# Patient Record
Sex: MALE | Race: WHITE | Employment: OTHER | URBAN - METROPOLITAN AREA
[De-identification: names, ages, dates, MRNs, and addresses within clinical notes are randomized per-mention and may not be internally consistent; named-entity substitution may affect disease eponyms.]

---

## 2023-05-26 ENCOUNTER — APPOINTMENT (OUTPATIENT)
Dept: GENERAL RADIOLOGY | Age: 63
End: 2023-05-26
Payer: MEDICARE

## 2023-05-26 ENCOUNTER — APPOINTMENT (OUTPATIENT)
Dept: ULTRASOUND IMAGING | Age: 63
End: 2023-05-26
Payer: MEDICARE

## 2023-05-26 ENCOUNTER — APPOINTMENT (OUTPATIENT)
Dept: CT IMAGING | Age: 63
End: 2023-05-26
Payer: MEDICARE

## 2023-05-26 ENCOUNTER — HOSPITAL ENCOUNTER (EMERGENCY)
Age: 63
Discharge: HOME | End: 2023-05-27
Attending: EMERGENCY MEDICINE
Payer: MEDICARE

## 2023-05-26 DIAGNOSIS — M79.605 BILATERAL LOWER EXTREMITY PAIN: ICD-10-CM

## 2023-05-26 DIAGNOSIS — M79.604 BILATERAL LOWER EXTREMITY PAIN: ICD-10-CM

## 2023-05-26 DIAGNOSIS — M25.532 LEFT WRIST PAIN: ICD-10-CM

## 2023-05-26 DIAGNOSIS — R50.9 FEVER OF UNKNOWN ORIGIN: Primary | ICD-10-CM

## 2023-05-26 LAB
ALBUMIN SERPL-MCNC: 3.6 G/DL (ref 3.2–4.6)
ALBUMIN/GLOB SERPL: 0.8 (ref 0.4–1.6)
ALP SERPL-CCNC: 95 U/L (ref 50–136)
ALT SERPL-CCNC: 44 U/L (ref 12–65)
ANION GAP SERPL CALC-SCNC: 5 MMOL/L (ref 2–11)
APPEARANCE UR: CLEAR
AST SERPL-CCNC: 19 U/L (ref 15–37)
BACTERIA URNS QL MICRO: NEGATIVE /HPF
BILIRUB SERPL-MCNC: 0.4 MG/DL (ref 0.2–1.1)
BILIRUB UR QL: NEGATIVE
BUN SERPL-MCNC: 16 MG/DL (ref 8–23)
CALCIUM SERPL-MCNC: 10.2 MG/DL (ref 8.3–10.4)
CASTS URNS QL MICRO: ABNORMAL /LPF
CHLORIDE SERPL-SCNC: 105 MMOL/L (ref 101–110)
CO2 SERPL-SCNC: 28 MMOL/L (ref 21–32)
COLOR UR: ABNORMAL
CREAT SERPL-MCNC: 1.1 MG/DL (ref 0.8–1.5)
D DIMER PPP FEU-MCNC: 1.08 UG/ML(FEU)
EPI CELLS #/AREA URNS HPF: ABNORMAL /HPF
ERYTHROCYTE [DISTWIDTH] IN BLOOD BY AUTOMATED COUNT: 12.3 % (ref 11.9–14.6)
GLOBULIN SER CALC-MCNC: 4.5 G/DL (ref 2.8–4.5)
GLUCOSE SERPL-MCNC: 112 MG/DL (ref 65–100)
GLUCOSE UR STRIP.AUTO-MCNC: NEGATIVE MG/DL
HCT VFR BLD AUTO: 43 % (ref 41.1–50.3)
HGB BLD-MCNC: 14.4 G/DL (ref 13.6–17.2)
HGB UR QL STRIP: ABNORMAL
KETONES UR QL STRIP.AUTO: ABNORMAL MG/DL
LEUKOCYTE ESTERASE UR QL STRIP.AUTO: NEGATIVE
MCH RBC QN AUTO: 30.1 PG (ref 26.1–32.9)
MCHC RBC AUTO-ENTMCNC: 33.5 G/DL (ref 31.4–35)
MCV RBC AUTO: 89.8 FL (ref 82–102)
NITRITE UR QL STRIP.AUTO: NEGATIVE
NRBC # BLD: 0 K/UL (ref 0–0.2)
PH UR STRIP: 5.5 (ref 5–9)
PLATELET # BLD AUTO: 345 K/UL (ref 150–450)
PMV BLD AUTO: 9.7 FL (ref 9.4–12.3)
POTASSIUM SERPL-SCNC: 3.8 MMOL/L (ref 3.5–5.1)
PROT SERPL-MCNC: 8.1 G/DL (ref 6.3–8.2)
PROT UR STRIP-MCNC: NEGATIVE MG/DL
RBC # BLD AUTO: 4.79 M/UL (ref 4.23–5.6)
RBC #/AREA URNS HPF: ABNORMAL /HPF
SODIUM SERPL-SCNC: 138 MMOL/L (ref 133–143)
SP GR UR REFRACTOMETRY: 1.03 (ref 1–1.02)
TROPONIN I SERPL HS-MCNC: 3.7 PG/ML (ref 0–57)
UROBILINOGEN UR QL STRIP.AUTO: 0.2 EU/DL (ref 0.2–1)
WBC # BLD AUTO: 13.9 K/UL (ref 4.3–11.1)
WBC URNS QL MICRO: ABNORMAL /HPF

## 2023-05-26 PROCEDURE — 6360000004 HC RX CONTRAST MEDICATION: Performed by: EMERGENCY MEDICINE

## 2023-05-26 PROCEDURE — 71260 CT THORAX DX C+: CPT

## 2023-05-26 PROCEDURE — 85379 FIBRIN DEGRADATION QUANT: CPT

## 2023-05-26 PROCEDURE — 81001 URINALYSIS AUTO W/SCOPE: CPT

## 2023-05-26 PROCEDURE — 93005 ELECTROCARDIOGRAM TRACING: CPT | Performed by: EMERGENCY MEDICINE

## 2023-05-26 PROCEDURE — 93971 EXTREMITY STUDY: CPT

## 2023-05-26 PROCEDURE — 80053 COMPREHEN METABOLIC PANEL: CPT

## 2023-05-26 PROCEDURE — 94762 N-INVAS EAR/PLS OXIMTRY CONT: CPT

## 2023-05-26 PROCEDURE — 85027 COMPLETE CBC AUTOMATED: CPT

## 2023-05-26 PROCEDURE — 84484 ASSAY OF TROPONIN QUANT: CPT

## 2023-05-26 PROCEDURE — 99285 EMERGENCY DEPT VISIT HI MDM: CPT

## 2023-05-26 PROCEDURE — 73110 X-RAY EXAM OF WRIST: CPT

## 2023-05-26 PROCEDURE — 2580000003 HC RX 258: Performed by: EMERGENCY MEDICINE

## 2023-05-26 RX ORDER — SODIUM CHLORIDE 0.9 % (FLUSH) 0.9 %
10 SYRINGE (ML) INJECTION
Status: COMPLETED | OUTPATIENT
Start: 2023-05-26 | End: 2023-05-26

## 2023-05-26 RX ORDER — 0.9 % SODIUM CHLORIDE 0.9 %
100 INTRAVENOUS SOLUTION INTRAVENOUS
Status: COMPLETED | OUTPATIENT
Start: 2023-05-26 | End: 2023-05-27

## 2023-05-26 RX ADMIN — SODIUM CHLORIDE 100 ML: 9 INJECTION, SOLUTION INTRAVENOUS at 23:39

## 2023-05-26 RX ADMIN — SODIUM CHLORIDE, PRESERVATIVE FREE 10 ML: 5 INJECTION INTRAVENOUS at 23:39

## 2023-05-26 RX ADMIN — IOPAMIDOL 100 ML: 755 INJECTION, SOLUTION INTRAVENOUS at 23:38

## 2023-05-26 ASSESSMENT — PAIN - FUNCTIONAL ASSESSMENT: PAIN_FUNCTIONAL_ASSESSMENT: 0-10

## 2023-05-26 ASSESSMENT — PAIN SCALES - GENERAL: PAINLEVEL_OUTOF10: 9

## 2023-05-26 NOTE — ED TRIAGE NOTES
Pt to ED with c/o left side chest pains, left arm pain and wrist swelling, bilateral leg swelling and weakness. Pt states hx of polio and colon cancer. Pt states was seen over at urgent care and sent to ED for evaluation due to elevated WBC of 13. Pt denies cough. Pt denies pain or difficulty urinating. Pt denies abdominal pain. Pt denies nausea or vomiting. Pt denies shortness of breath. Pt states hx of a heart valve not working right. Pt alert ambulatory and in NAD at this time.

## 2023-05-27 VITALS
TEMPERATURE: 98.7 F | DIASTOLIC BLOOD PRESSURE: 89 MMHG | HEART RATE: 88 BPM | BODY MASS INDEX: 30.49 KG/M2 | HEIGHT: 70 IN | SYSTOLIC BLOOD PRESSURE: 145 MMHG | RESPIRATION RATE: 19 BRPM | WEIGHT: 213 LBS | OXYGEN SATURATION: 93 %

## 2023-05-27 LAB
EKG ATRIAL RATE: 80 BPM
EKG DIAGNOSIS: NORMAL
EKG P AXIS: 53 DEGREES
EKG P-R INTERVAL: 172 MS
EKG Q-T INTERVAL: 396 MS
EKG QRS DURATION: 111 MS
EKG QTC CALCULATION (BAZETT): 460 MS
EKG R AXIS: 80 DEGREES
EKG T AXIS: 38 DEGREES
EKG VENTRICULAR RATE: 81 BPM

## 2023-05-27 PROCEDURE — 93010 ELECTROCARDIOGRAM REPORT: CPT | Performed by: INTERNAL MEDICINE

## 2023-05-27 PROCEDURE — 87040 BLOOD CULTURE FOR BACTERIA: CPT

## 2023-05-27 NOTE — ED PROVIDER NOTES
Emergency Department Provider Note                   PCP:                Zara Josue DO               Age: 58 y.o. Sex: male   Final diagnosis/impression:  1. Fever of unknown origin    2. Bilateral lower extremity pain    3. Left wrist pain       Disposition: Discharged    MDM/Clinical Course:  Patient seen by myself at the [facility] emergency department. Patient had signs symptoms and clinical history most consistent with nonspecific symptoms as previously discussed, elevated white blood cell count at 13 from outpatient urgent care labs per family report. I am somewhat concerned, particular in the context of patient history of previous colon cancer that some symptoms could essentially be malignancy related. Do not feel left wrist represents septic arthritis in the wrist as freely mobile with range of motion intact, no increased calor, some soft tissue swelling of unclear etiology, could represent early gout flare but do not feel strongly that this is the case either. My independent analysis/interpretation of laboratory work-up here shows CBC shows elevation at 13.9, CMP is unremarkable for acute/emergent abnormality, D-dimer is noted to be elevated at 1.08 prompting CT a of the chest, urinalysis shows minimal urine blood with 10-20 urine red blood cells and trace blood on macro testing, elevated urine specific gravity, trace urine ketones. Did discuss with family need to follow-up with primary care physician regarding trace hematuria with consideration of urology consult if this does not clear up. Blood cultures were drawn and are pending. EKG unremarkable for acute/emergent abnormality. Troponin negative for acute/emergent abnormality. X-ray of the wrist unremarkable for acute/emergent abnormality, noted 3 mm foreign body, spoke with family this is old, please see the x-ray read independent below for additional information.   DVT study ordered as patient reports increased pain of the left

## 2023-05-27 NOTE — DISCHARGE INSTRUCTIONS
Please follow-up with your primary care provider regarding your symptoms. We recommend rest, ice, elevation of your wrist and close monitoring of your symptoms. Return as needed for questions, concerns or worsening symptoms.

## 2023-05-27 NOTE — ED NOTES
I have reviewed discharge instructions with the patient. The patient verbalized understanding. Patient left ED via Discharge Method: ambulatory to Home with self. Opportunity for questions and clarification provided. Patient given 0 scripts. To continue your aftercare when you leave the hospital, you may receive an automated call from our care team to check in on how you are doing. This is a free service and part of our promise to provide the best care and service to meet your aftercare needs.  If you have questions, or wish to unsubscribe from this service please call [phone]. Thank you for Choosing our [facility] Emergency Department.         Vasquez Ross RN  05/27/23 0206

## 2023-05-28 LAB
BACTERIA SPEC CULT: NORMAL
BACTERIA SPEC CULT: NORMAL
SERVICE CMNT-IMP: NORMAL
SERVICE CMNT-IMP: NORMAL

## 2023-05-30 ENCOUNTER — OFFICE VISIT (OUTPATIENT)
Dept: INTERNAL MEDICINE CLINIC | Facility: CLINIC | Age: 63
End: 2023-05-30
Payer: MEDICARE

## 2023-05-30 ENCOUNTER — NURSE TRIAGE (OUTPATIENT)
Dept: OTHER | Facility: CLINIC | Age: 63
End: 2023-05-30

## 2023-05-30 VITALS
HEIGHT: 70 IN | DIASTOLIC BLOOD PRESSURE: 89 MMHG | OXYGEN SATURATION: 94 % | WEIGHT: 208 LBS | HEART RATE: 108 BPM | BODY MASS INDEX: 29.78 KG/M2 | TEMPERATURE: 98.2 F | SYSTOLIC BLOOD PRESSURE: 130 MMHG

## 2023-05-30 DIAGNOSIS — R61 NIGHT SWEATS: ICD-10-CM

## 2023-05-30 DIAGNOSIS — K80.20 CALCULUS OF GALLBLADDER WITHOUT CHOLECYSTITIS WITHOUT OBSTRUCTION: ICD-10-CM

## 2023-05-30 DIAGNOSIS — R53.1 WEAKNESS: ICD-10-CM

## 2023-05-30 DIAGNOSIS — K91.2 POSTSURGICAL MALABSORPTION, NOT ELSEWHERE CLASSIFIED: ICD-10-CM

## 2023-05-30 DIAGNOSIS — F41.1 GENERALIZED ANXIETY DISORDER: ICD-10-CM

## 2023-05-30 DIAGNOSIS — K44.9 HIATAL HERNIA: ICD-10-CM

## 2023-05-30 DIAGNOSIS — Z76.89 ESTABLISHING CARE WITH NEW DOCTOR, ENCOUNTER FOR: Primary | ICD-10-CM

## 2023-05-30 DIAGNOSIS — Z87.828 HISTORY OF GUNSHOT WOUND: ICD-10-CM

## 2023-05-30 DIAGNOSIS — Z87.898 HX OF SOLITARY PULMONARY NODULE: ICD-10-CM

## 2023-05-30 DIAGNOSIS — Z85.038 HISTORY OF COLON CANCER: ICD-10-CM

## 2023-05-30 DIAGNOSIS — M41.50 OTHER SECONDARY SCOLIOSIS, UNSPECIFIED SPINAL REGION: ICD-10-CM

## 2023-05-30 DIAGNOSIS — Z18.10 RETAINED METAL FRAGMENT FOREIGN BODY: ICD-10-CM

## 2023-05-30 DIAGNOSIS — Z86.12 HX OF POST-POLIO SYNDROME: ICD-10-CM

## 2023-05-30 DIAGNOSIS — M25.50 POLYARTHRALGIA: ICD-10-CM

## 2023-05-30 PROBLEM — M41.9 SCOLIOSIS: Status: ACTIVE | Noted: 2023-05-30

## 2023-05-30 LAB — 25(OH)D3 SERPL-MCNC: 34.3 NG/ML (ref 30–100)

## 2023-05-30 PROCEDURE — G8427 DOCREV CUR MEDS BY ELIG CLIN: HCPCS | Performed by: INTERNAL MEDICINE

## 2023-05-30 PROCEDURE — G8419 CALC BMI OUT NRM PARAM NOF/U: HCPCS | Performed by: INTERNAL MEDICINE

## 2023-05-30 PROCEDURE — 1036F TOBACCO NON-USER: CPT | Performed by: INTERNAL MEDICINE

## 2023-05-30 PROCEDURE — 3017F COLORECTAL CA SCREEN DOC REV: CPT | Performed by: INTERNAL MEDICINE

## 2023-05-30 PROCEDURE — 99214 OFFICE O/P EST MOD 30 MIN: CPT | Performed by: INTERNAL MEDICINE

## 2023-05-30 RX ORDER — LORATADINE 10 MG/1
10 TABLET ORAL DAILY
COMMUNITY

## 2023-05-30 RX ORDER — PREDNISONE 10 MG/1
TABLET ORAL
Qty: 13 TABLET | Refills: 0 | Status: SHIPPED | OUTPATIENT
Start: 2023-05-30

## 2023-05-30 RX ORDER — FLUTICASONE PROPIONATE 50 MCG
1 SPRAY, SUSPENSION (ML) NASAL DAILY
COMMUNITY

## 2023-05-30 RX ORDER — CALCIUM CARBONATE 200(500)MG
1 TABLET,CHEWABLE ORAL DAILY
COMMUNITY

## 2023-05-30 SDOH — ECONOMIC STABILITY: FOOD INSECURITY: WITHIN THE PAST 12 MONTHS, THE FOOD YOU BOUGHT JUST DIDN'T LAST AND YOU DIDN'T HAVE MONEY TO GET MORE.: NEVER TRUE

## 2023-05-30 SDOH — ECONOMIC STABILITY: HOUSING INSECURITY
IN THE LAST 12 MONTHS, WAS THERE A TIME WHEN YOU DID NOT HAVE A STEADY PLACE TO SLEEP OR SLEPT IN A SHELTER (INCLUDING NOW)?: NO

## 2023-05-30 SDOH — ECONOMIC STABILITY: TRANSPORTATION INSECURITY
IN THE PAST 12 MONTHS, HAS LACK OF TRANSPORTATION KEPT YOU FROM MEETINGS, WORK, OR FROM GETTING THINGS NEEDED FOR DAILY LIVING?: NO

## 2023-05-30 SDOH — ECONOMIC STABILITY: INCOME INSECURITY: HOW HARD IS IT FOR YOU TO PAY FOR THE VERY BASICS LIKE FOOD, HOUSING, MEDICAL CARE, AND HEATING?: NOT HARD AT ALL

## 2023-05-30 SDOH — ECONOMIC STABILITY: FOOD INSECURITY: WITHIN THE PAST 12 MONTHS, YOU WORRIED THAT YOUR FOOD WOULD RUN OUT BEFORE YOU GOT MONEY TO BUY MORE.: NEVER TRUE

## 2023-05-30 ASSESSMENT — PATIENT HEALTH QUESTIONNAIRE - PHQ9
SUM OF ALL RESPONSES TO PHQ QUESTIONS 1-9: 0
SUM OF ALL RESPONSES TO PHQ QUESTIONS 1-9: 0
SUM OF ALL RESPONSES TO PHQ9 QUESTIONS 1 & 2: 0
2. FEELING DOWN, DEPRESSED OR HOPELESS: 0
1. LITTLE INTEREST OR PLEASURE IN DOING THINGS: 0
SUM OF ALL RESPONSES TO PHQ9 QUESTIONS 1 & 2: 0
SUM OF ALL RESPONSES TO PHQ QUESTIONS 1-9: 0
1. LITTLE INTEREST OR PLEASURE IN DOING THINGS: NOT AT ALL
2. FEELING DOWN, DEPRESSED OR HOPELESS: NOT AT ALL
SUM OF ALL RESPONSES TO PHQ QUESTIONS 1-9: 0

## 2023-05-30 NOTE — PROGRESS NOTES
status: Never    Smokeless tobacco: Never   Vaping Use    Vaping Use: Never used   Substance Use Topics    Alcohol use: Never    Drug use: Never     Vitals:    05/30/23 1035   BP: 130/89   Site: Right Upper Arm   Position: Sitting   Cuff Size: Medium Adult   Pulse: (!) 108   Temp: 98.2 °F (36.8 °C)   SpO2: 94%   Weight: 208 lb (94.3 kg)   Height: 5' 10\" (1.778 m)      Body mass index is 29.84 kg/m². Physical Exam  Constitutional:       Appearance: Normal appearance. HENT:      Head: Normocephalic. Eyes:      Extraocular Movements: Extraocular movements intact. Cardiovascular:      Rate and Rhythm: Normal rate and regular rhythm. Musculoskeletal:      Comments: Right wrist edema present. Skin:     General: Skin is warm and dry. Neurological:      Mental Status: He is alert. An electronic signature was used to authenticate this note.   Jasmina Eldridge DO

## 2023-05-30 NOTE — ACP (ADVANCE CARE PLANNING)
Advance Care Planning   The patient has the following advanced directives on file:  Advance Directives       Power of [address] ACP-Advance Directive ACP-Power of     Not on File Not on File Not on File Not on File            The patient has appointed the following active healthcare agents:    Primary Decision Maker: Yobani Rueda - [phone]    The Patient has the following current code status:    Code Status: Not on file    Visit Documentation:  I discussed [address] with Marie Infante today which included the importance of making their choices for care and treatment in the case of a health event that adversely affects their decision-making abilities. He has not completed the Advance Care Directives. He does not have an active health care agent at this time. Marie Infante was encouraged to complete the declaration forms and provide a signed copy of his medical records.            Charo Meza  5/30/2023
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
## 2023-05-30 NOTE — ASSESSMENT & PLAN NOTE
Patient with significant anxiety related to treatment for colon cancer. Seen a provider for colon cancer over 10 years ago. Dr. Hendrix Zeeshan at [address] [place] [address]. He has never seen a GI doctor. Diagnosed with colon cancer the age of 39. Father with lung cancer. Brother with leukemia.

## 2023-05-30 NOTE — TELEPHONE ENCOUNTER
Location of patient: [place]    Received call from [facility] at [facility] with code-laboration. Caller has a new patient appointment scheduled for 6/13/2023 and would like a sooner appointment. Current Symptoms: bilateral knee pain pain, bilateral arm pain from elbow to wrist    Seen in ED on 5/26/2023 for same symptoms - pain in right arm has increased since ED visit    Onset: 2 weeks ago;      Pain Severity: 7/10; Temperature: 99.9F yesterday    What has been tried: aleve, tylenol    Recommended disposition: See PCP within 3 Days  Patient does not have a PCP and was advised to go to an [address] [place], [place] [address] Ave for current problem. Care advice provided, patient verbalizes understanding; denies any other questions or concerns; instructed to call back for any new or worsening symptoms. Patient/Caller agrees with recommended disposition; writer provided warm transfer to Kim Deng at [facility] for appointment scheduling    Attention Provider: Thank you for allowing me to participate in the care of your patient. The patient was connected to triage in response to information provided to the ECC/PSC. Please do not respond through this encounter as the response is not directed to a shared pool.         Reason for Disposition   MODERATE pain (e.g., interferes with normal activities) and present > 3 days    Protocols used: Arm Pain-ADULT-[place]

## 2023-05-31 LAB — RHEUMATOID FACT SER QL LA: NEGATIVE

## 2023-06-01 LAB
ANA SER QL: NEGATIVE
BACTERIA SPEC CULT: NORMAL
BACTERIA SPEC CULT: NORMAL
CCP IGA+IGG SERPL IA-ACNC: 4 UNITS (ref 0–19)
SERVICE CMNT-IMP: NORMAL
SERVICE CMNT-IMP: NORMAL

## 2023-06-08 PROBLEM — C18.7 CANCER OF SIGMOID COLON (HCC): Status: ACTIVE | Noted: 2018-10-19

## 2023-06-08 PROBLEM — E78.2 MIXED HYPERLIPIDEMIA: Status: ACTIVE | Noted: 2018-10-19

## 2023-06-08 PROBLEM — R74.01 TRANSAMINITIS: Status: ACTIVE | Noted: 2023-06-08

## 2023-06-08 PROBLEM — J30.2 SEASONAL ALLERGIES: Status: ACTIVE | Noted: 2019-08-19

## 2023-06-08 PROBLEM — M89.662: Status: ACTIVE | Noted: 2018-10-24

## 2023-06-08 PROBLEM — B91: Status: ACTIVE | Noted: 2018-10-24

## 2023-06-08 PROBLEM — M15.9 GENERALIZED OSTEOARTHRITIS: Status: ACTIVE | Noted: 2018-10-19

## 2023-06-08 PROBLEM — R65.10 SIRS (SYSTEMIC INFLAMMATORY RESPONSE SYNDROME) (HCC): Status: ACTIVE | Noted: 2023-06-08

## 2023-06-08 PROBLEM — G25.81 RESTLESS LEG SYNDROME: Status: ACTIVE | Noted: 2018-10-19

## 2023-06-08 PROBLEM — Z76.89 ESTABLISHING CARE WITH NEW DOCTOR, ENCOUNTER FOR: Status: RESOLVED | Noted: 2023-05-30 | Resolved: 2023-06-08

## 2023-06-09 PROBLEM — D72.829 LEUKOCYTOSIS: Status: ACTIVE | Noted: 2023-06-09

## 2023-06-09 PROBLEM — E61.1 IRON DEFICIENCY: Chronic | Status: ACTIVE | Noted: 2023-06-09

## 2023-06-09 PROBLEM — N45.1 LEFT EPIDIDYMITIS: Status: ACTIVE | Noted: 2023-06-09

## 2023-06-13 ENCOUNTER — TELEPHONE (OUTPATIENT)
Dept: UROLOGY | Age: 63
End: 2023-06-13

## 2023-06-13 PROBLEM — D75.839 THROMBOCYTOSIS: Status: ACTIVE | Noted: 2023-06-13

## 2023-06-14 ENCOUNTER — TELEPHONE (OUTPATIENT)
Dept: NEUROLOGY | Age: 63
End: 2023-06-14

## 2023-06-16 PROBLEM — E66.09 EXOGENOUS OBESITY: Status: ACTIVE | Noted: 2018-10-19

## 2023-06-16 PROBLEM — M79.10 MUSCULAR PAIN: Status: ACTIVE | Noted: 2023-06-16

## 2023-06-20 ENCOUNTER — TELEPHONE (OUTPATIENT)
Dept: INTERNAL MEDICINE CLINIC | Facility: CLINIC | Age: 63
End: 2023-06-20

## 2023-06-20 NOTE — TELEPHONE ENCOUNTER
----- Message from [facility] sent at 6/14/2023 10:54 AM EDT -----  Subject: Hospital Follow Up    QUESTIONS  What hospital was the Patient Discharged from[facility]  Date of Discharge? 2023-06-14  Discharge Location? Home  Reason for hospitalization as patient stated? SIRS  What question does the patient have, if applicable?   ---------------------------------------------------------------------------  --------------  CALL BACK INFO  What is the best way for the office to contact you? OK to leave message on   voicemail  Preferred Call Back Phone Number? [phone]  ---------------------------------------------------------------------------  --------------  SCRIPT ANSWERS  Relationship to Patient? Covered Entity  Covered Entity Type? Hospital?  Representative Name?  [address] [place] [address]

## 2023-06-26 ENCOUNTER — OFFICE VISIT (OUTPATIENT)
Dept: INTERNAL MEDICINE CLINIC | Facility: CLINIC | Age: 63
End: 2023-06-26

## 2023-06-26 VITALS
TEMPERATURE: 98.6 F | SYSTOLIC BLOOD PRESSURE: 132 MMHG | DIASTOLIC BLOOD PRESSURE: 86 MMHG | OXYGEN SATURATION: 94 % | HEART RATE: 118 BPM | WEIGHT: 176.4 LBS | BODY MASS INDEX: 25.25 KG/M2 | HEIGHT: 70 IN

## 2023-06-26 DIAGNOSIS — C18.7 CANCER OF SIGMOID COLON (HCC): ICD-10-CM

## 2023-06-26 DIAGNOSIS — D75.839 THROMBOCYTOSIS: ICD-10-CM

## 2023-06-26 DIAGNOSIS — R74.01 TRANSAMINITIS: ICD-10-CM

## 2023-06-26 DIAGNOSIS — M15.9 GENERALIZED OSTEOARTHRITIS: ICD-10-CM

## 2023-06-26 DIAGNOSIS — M25.50 POLYARTHRALGIA: ICD-10-CM

## 2023-06-26 DIAGNOSIS — E61.1 IRON DEFICIENCY: Chronic | ICD-10-CM

## 2023-06-26 DIAGNOSIS — J30.2 SEASONAL ALLERGIES: ICD-10-CM

## 2023-06-26 DIAGNOSIS — D72.829 LEUKOCYTOSIS, UNSPECIFIED TYPE: ICD-10-CM

## 2023-06-26 DIAGNOSIS — M06.1 ADULT-ONSET STILL'S DISEASE (HCC): ICD-10-CM

## 2023-06-26 DIAGNOSIS — F51.04 PSYCHOPHYSIOLOGICAL INSOMNIA: ICD-10-CM

## 2023-06-26 DIAGNOSIS — Z09 HOSPITAL DISCHARGE FOLLOW-UP: Primary | ICD-10-CM

## 2023-06-26 DIAGNOSIS — G25.81 RESTLESS LEG SYNDROME: ICD-10-CM

## 2023-06-26 DIAGNOSIS — F41.1 GENERALIZED ANXIETY DISORDER: ICD-10-CM

## 2023-06-26 LAB
BASOPHILS # BLD: 0.2 K/UL (ref 0–0.2)
BASOPHILS NFR BLD: 1 % (ref 0–2)
DIFFERENTIAL METHOD BLD: ABNORMAL
EOSINOPHIL # BLD: 1.1 K/UL (ref 0–0.8)
EOSINOPHIL NFR BLD: 6 % (ref 0.5–7.8)
ERYTHROCYTE [DISTWIDTH] IN BLOOD BY AUTOMATED COUNT: 12.9 % (ref 11.9–14.6)
ERYTHROCYTE [SEDIMENTATION RATE] IN BLOOD: 54 MM/HR
HCT VFR BLD AUTO: 35.4 % (ref 41.1–50.3)
HGB BLD-MCNC: 11.3 G/DL (ref 13.6–17.2)
IMM GRANULOCYTES # BLD AUTO: 0.3 K/UL (ref 0–0.5)
IMM GRANULOCYTES NFR BLD AUTO: 1 % (ref 0–5)
LYMPHOCYTES # BLD: 4.9 K/UL (ref 0.5–4.6)
LYMPHOCYTES NFR BLD: 27 % (ref 13–44)
MCH RBC QN AUTO: 28.6 PG (ref 26.1–32.9)
MCHC RBC AUTO-ENTMCNC: 31.9 G/DL (ref 31.4–35)
MCV RBC AUTO: 89.6 FL (ref 82–102)
MONOCYTES # BLD: 1.2 K/UL (ref 0.1–1.3)
MONOCYTES NFR BLD: 7 % (ref 4–12)
NEUTS SEG # BLD: 10.3 K/UL (ref 1.7–8.2)
NEUTS SEG NFR BLD: 57 % (ref 43–78)
NRBC # BLD: 0 K/UL (ref 0–0.2)
PLATELET # BLD AUTO: 930 K/UL (ref 150–450)
PMV BLD AUTO: 9.9 FL (ref 9.4–12.3)
RBC # BLD AUTO: 3.95 M/UL (ref 4.23–5.6)
WBC # BLD AUTO: 18 K/UL (ref 4.3–11.1)

## 2023-06-26 RX ORDER — AMITRIPTYLINE HYDROCHLORIDE 25 MG/1
25 TABLET, FILM COATED ORAL NIGHTLY
Qty: 30 TABLET | Refills: 3 | Status: SHIPPED | OUTPATIENT
Start: 2023-06-26

## 2023-06-26 RX ORDER — ROPINIROLE 0.5 MG/1
0.5 TABLET, FILM COATED ORAL 3 TIMES DAILY
Qty: 90 TABLET | Refills: 3 | Status: SHIPPED | OUTPATIENT
Start: 2023-06-26

## 2023-06-26 RX ORDER — LORAZEPAM 0.5 MG/1
0.5 TABLET ORAL EVERY 8 HOURS PRN
Qty: 90 TABLET | Refills: 0 | Status: SHIPPED | OUTPATIENT
Start: 2023-06-26 | End: 2023-06-29 | Stop reason: SDUPTHER

## 2023-06-26 RX ORDER — FLUTICASONE PROPIONATE 50 MCG
1 SPRAY, SUSPENSION (ML) NASAL DAILY
Qty: 16 G | Refills: 3 | Status: SHIPPED | OUTPATIENT
Start: 2023-06-26

## 2023-06-26 RX ORDER — CIPROFLOXACIN 500 MG/1
500 TABLET, FILM COATED ORAL 2 TIMES DAILY
Qty: 24 TABLET | Refills: 0 | Status: SHIPPED | OUTPATIENT
Start: 2023-06-26 | End: 2023-07-08

## 2023-06-26 ASSESSMENT — PATIENT HEALTH QUESTIONNAIRE - PHQ9
SUM OF ALL RESPONSES TO PHQ QUESTIONS 1-9: 2
1. LITTLE INTEREST OR PLEASURE IN DOING THINGS: 0
2. FEELING DOWN, DEPRESSED OR HOPELESS: 2
SUM OF ALL RESPONSES TO PHQ QUESTIONS 1-9: 2
SUM OF ALL RESPONSES TO PHQ9 QUESTIONS 1 & 2: 2

## 2023-06-26 ASSESSMENT — ANXIETY QUESTIONNAIRES
2. NOT BEING ABLE TO STOP OR CONTROL WORRYING: 2
1. FEELING NERVOUS, ANXIOUS, OR ON EDGE: 2
GAD7 TOTAL SCORE: 14
3. WORRYING TOO MUCH ABOUT DIFFERENT THINGS: 2
4. TROUBLE RELAXING: 2
5. BEING SO RESTLESS THAT IT IS HARD TO SIT STILL: 2
6. BECOMING EASILY ANNOYED OR IRRITABLE: 2
7. FEELING AFRAID AS IF SOMETHING AWFUL MIGHT HAPPEN: 2
IF YOU CHECKED OFF ANY PROBLEMS ON THIS QUESTIONNAIRE, HOW DIFFICULT HAVE THESE PROBLEMS MADE IT FOR YOU TO DO YOUR WORK, TAKE CARE OF THINGS AT HOME, OR GET ALONG WITH OTHER PEOPLE: VERY DIFFICULT

## 2023-06-27 ENCOUNTER — OFFICE VISIT (OUTPATIENT)
Dept: GASTROENTEROLOGY | Age: 63
End: 2023-06-27
Payer: MEDICARE

## 2023-06-27 VITALS
BODY MASS INDEX: 26.2 KG/M2 | OXYGEN SATURATION: 91 % | HEIGHT: 70 IN | TEMPERATURE: 97.6 F | SYSTOLIC BLOOD PRESSURE: 124 MMHG | HEART RATE: 121 BPM | WEIGHT: 183 LBS | DIASTOLIC BLOOD PRESSURE: 86 MMHG

## 2023-06-27 DIAGNOSIS — R63.4 WEIGHT LOSS: ICD-10-CM

## 2023-06-27 DIAGNOSIS — R11.2 NAUSEA AND VOMITING IN ADULT: Primary | ICD-10-CM

## 2023-06-27 DIAGNOSIS — D50.0 IRON DEFICIENCY ANEMIA DUE TO CHRONIC BLOOD LOSS: ICD-10-CM

## 2023-06-27 DIAGNOSIS — Z85.038 HISTORY OF COLON CANCER: ICD-10-CM

## 2023-06-27 DIAGNOSIS — K62.5 RECTAL BLEEDING: ICD-10-CM

## 2023-06-27 LAB
ALBUMIN SERPL-MCNC: 2.5 G/DL (ref 3.2–4.6)
ALBUMIN/GLOB SERPL: 0.4 (ref 0.4–1.6)
ALP SERPL-CCNC: 130 U/L (ref 50–136)
ALT SERPL-CCNC: 95 U/L (ref 12–65)
ANA SER QL: NEGATIVE
ANION GAP SERPL CALC-SCNC: 8 MMOL/L (ref 2–11)
AST SERPL-CCNC: 44 U/L (ref 15–37)
BILIRUB SERPL-MCNC: 0.5 MG/DL (ref 0.2–1.1)
BUN SERPL-MCNC: 16 MG/DL (ref 8–23)
CALCIUM SERPL-MCNC: 10.1 MG/DL (ref 8.3–10.4)
CHLORIDE SERPL-SCNC: 98 MMOL/L (ref 101–110)
CO2 SERPL-SCNC: 22 MMOL/L (ref 21–32)
CREAT SERPL-MCNC: 1 MG/DL (ref 0.8–1.5)
CRP SERPL-MCNC: 19 MG/DL (ref 0–0.9)
FERRITIN SERPL-MCNC: 4669 NG/ML (ref 8–388)
GLOBULIN SER CALC-MCNC: 6.6 G/DL (ref 2.8–4.5)
GLUCOSE SERPL-MCNC: 117 MG/DL (ref 65–100)
IRON SERPL-MCNC: 22 UG/DL (ref 35–150)
POTASSIUM SERPL-SCNC: 4.3 MMOL/L (ref 3.5–5.1)
PROT SERPL-MCNC: 9.1 G/DL (ref 6.3–8.2)
SODIUM SERPL-SCNC: 128 MMOL/L (ref 133–143)

## 2023-06-27 PROCEDURE — 1036F TOBACCO NON-USER: CPT | Performed by: INTERNAL MEDICINE

## 2023-06-27 PROCEDURE — 3017F COLORECTAL CA SCREEN DOC REV: CPT | Performed by: INTERNAL MEDICINE

## 2023-06-27 PROCEDURE — G8419 CALC BMI OUT NRM PARAM NOF/U: HCPCS | Performed by: INTERNAL MEDICINE

## 2023-06-27 PROCEDURE — 1111F DSCHRG MED/CURRENT MED MERGE: CPT | Performed by: INTERNAL MEDICINE

## 2023-06-27 PROCEDURE — G8428 CUR MEDS NOT DOCUMENT: HCPCS | Performed by: INTERNAL MEDICINE

## 2023-06-27 PROCEDURE — 99204 OFFICE O/P NEW MOD 45 MIN: CPT | Performed by: INTERNAL MEDICINE

## 2023-06-27 RX ORDER — ASPIRIN/CALCIUM/MAG/ALUMINUM 325 MG
1 TABLET ORAL DAILY
Qty: 30 TABLET | Refills: 3 | Status: SHIPPED | OUTPATIENT
Start: 2023-06-27

## 2023-06-27 RX ORDER — POLYETHYLENE GLYCOL 3350, SODIUM CHLORIDE, POTASSIUM CHLORIDE, SODIUM BICARBONATE, AND SODIUM SULFATE 240; 5.84; 2.98; 6.72; 22.72 G/4L; G/4L; G/4L; G/4L; G/4L
4000 POWDER, FOR SOLUTION ORAL ONCE
Qty: 4000 ML | Refills: 0 | Status: SHIPPED | OUTPATIENT
Start: 2023-06-27 | End: 2023-06-27

## 2023-06-27 ASSESSMENT — ENCOUNTER SYMPTOMS
BLOOD IN STOOL: 1
NAUSEA: 1
VOMITING: 1

## 2023-06-28 ENCOUNTER — OFFICE VISIT (OUTPATIENT)
Dept: UROLOGY | Age: 63
End: 2023-06-28
Payer: MEDICARE

## 2023-06-28 DIAGNOSIS — N45.1 LEFT EPIDIDYMITIS: Primary | ICD-10-CM

## 2023-06-28 DIAGNOSIS — N43.3 LEFT HYDROCELE: ICD-10-CM

## 2023-06-28 DIAGNOSIS — R97.20 ELEVATED PSA: ICD-10-CM

## 2023-06-28 LAB
BILIRUBIN, URINE, POC: NORMAL
BLOOD URINE, POC: NORMAL
GLUCOSE URINE, POC: NEGATIVE
KETONES, URINE, POC: NORMAL
LEUKOCYTE ESTERASE, URINE, POC: NEGATIVE
NITRITE, URINE, POC: NEGATIVE
PH, URINE, POC: 5.5 (ref 4.6–8)
PROTEIN,URINE, POC: 30
SPECIFIC GRAVITY, URINE, POC: 1.02 (ref 1–1.03)
URINALYSIS CLARITY, POC: NORMAL
URINALYSIS COLOR, POC: NORMAL
UROBILINOGEN, POC: NORMAL

## 2023-06-28 PROCEDURE — 1036F TOBACCO NON-USER: CPT | Performed by: NURSE PRACTITIONER

## 2023-06-28 PROCEDURE — G8419 CALC BMI OUT NRM PARAM NOF/U: HCPCS | Performed by: NURSE PRACTITIONER

## 2023-06-28 PROCEDURE — G8427 DOCREV CUR MEDS BY ELIG CLIN: HCPCS | Performed by: NURSE PRACTITIONER

## 2023-06-28 PROCEDURE — 1111F DSCHRG MED/CURRENT MED MERGE: CPT | Performed by: NURSE PRACTITIONER

## 2023-06-28 PROCEDURE — 3017F COLORECTAL CA SCREEN DOC REV: CPT | Performed by: NURSE PRACTITIONER

## 2023-06-28 PROCEDURE — 99213 OFFICE O/P EST LOW 20 MIN: CPT | Performed by: NURSE PRACTITIONER

## 2023-06-28 PROCEDURE — 81003 URINALYSIS AUTO W/O SCOPE: CPT | Performed by: NURSE PRACTITIONER

## 2023-06-28 ASSESSMENT — ENCOUNTER SYMPTOMS: NAUSEA: 1

## 2023-06-29 DIAGNOSIS — F41.1 GENERALIZED ANXIETY DISORDER: ICD-10-CM

## 2023-06-29 RX ORDER — LORAZEPAM 0.5 MG/1
0.5 TABLET ORAL EVERY 8 HOURS PRN
Qty: 90 TABLET | Refills: 0 | Status: SHIPPED | OUTPATIENT
Start: 2023-06-29 | End: 2023-07-29

## 2023-07-02 ASSESSMENT — PATIENT HEALTH QUESTIONNAIRE - PHQ9
SUM OF ALL RESPONSES TO PHQ QUESTIONS 1-9: 2
2. FEELING DOWN, DEPRESSED OR HOPELESS: 0
2. FEELING DOWN, DEPRESSED OR HOPELESS: NOT AT ALL
1. LITTLE INTEREST OR PLEASURE IN DOING THINGS: 2
SUM OF ALL RESPONSES TO PHQ QUESTIONS 1-9: 2
1. LITTLE INTEREST OR PLEASURE IN DOING THINGS: MORE THAN HALF THE DAYS
SUM OF ALL RESPONSES TO PHQ9 QUESTIONS 1 & 2: 2
SUM OF ALL RESPONSES TO PHQ QUESTIONS 1-9: 2
SUM OF ALL RESPONSES TO PHQ9 QUESTIONS 1 & 2: 2
SUM OF ALL RESPONSES TO PHQ QUESTIONS 1-9: 2

## 2023-07-05 ENCOUNTER — OFFICE VISIT (OUTPATIENT)
Dept: INTERNAL MEDICINE CLINIC | Facility: CLINIC | Age: 63
End: 2023-07-05
Payer: MEDICARE

## 2023-07-05 VITALS
DIASTOLIC BLOOD PRESSURE: 72 MMHG | HEART RATE: 92 BPM | OXYGEN SATURATION: 96 % | TEMPERATURE: 98.4 F | HEIGHT: 70 IN | SYSTOLIC BLOOD PRESSURE: 116 MMHG | BODY MASS INDEX: 24.34 KG/M2 | WEIGHT: 170 LBS

## 2023-07-05 DIAGNOSIS — C18.7 CANCER OF SIGMOID COLON (HCC): ICD-10-CM

## 2023-07-05 DIAGNOSIS — N45.1 LEFT EPIDIDYMITIS: ICD-10-CM

## 2023-07-05 DIAGNOSIS — E61.1 IRON DEFICIENCY: Chronic | ICD-10-CM

## 2023-07-05 DIAGNOSIS — Z12.5 PROSTATE CANCER SCREENING: ICD-10-CM

## 2023-07-05 DIAGNOSIS — D75.839 THROMBOCYTOSIS: ICD-10-CM

## 2023-07-05 DIAGNOSIS — E87.1 HYPONATREMIA: ICD-10-CM

## 2023-07-05 DIAGNOSIS — N45.1 LEFT EPIDIDYMITIS: Primary | ICD-10-CM

## 2023-07-05 LAB
ALBUMIN SERPL-MCNC: 2.5 G/DL (ref 3.2–4.6)
ALBUMIN/GLOB SERPL: 0.5 (ref 0.4–1.6)
ALP SERPL-CCNC: 101 U/L (ref 50–136)
ALT SERPL-CCNC: 58 U/L (ref 12–65)
ANION GAP SERPL CALC-SCNC: 6 MMOL/L (ref 2–11)
AST SERPL-CCNC: 21 U/L (ref 15–37)
BASOPHILS # BLD: 0.2 K/UL (ref 0–0.2)
BASOPHILS NFR BLD: 1 % (ref 0–2)
BILIRUB SERPL-MCNC: 0.4 MG/DL (ref 0.2–1.1)
BUN SERPL-MCNC: 14 MG/DL (ref 8–23)
CALCIUM SERPL-MCNC: 10 MG/DL (ref 8.3–10.4)
CHLORIDE SERPL-SCNC: 102 MMOL/L (ref 101–110)
CO2 SERPL-SCNC: 26 MMOL/L (ref 21–32)
CREAT SERPL-MCNC: 0.9 MG/DL (ref 0.8–1.5)
DIFFERENTIAL METHOD BLD: ABNORMAL
EOSINOPHIL # BLD: 0.9 K/UL (ref 0–0.8)
EOSINOPHIL NFR BLD: 7 % (ref 0.5–7.8)
ERYTHROCYTE [DISTWIDTH] IN BLOOD BY AUTOMATED COUNT: 13 % (ref 11.9–14.6)
FERRITIN SERPL-MCNC: 2284 NG/ML (ref 8–388)
GLOBULIN SER CALC-MCNC: 4.9 G/DL (ref 2.8–4.5)
GLUCOSE SERPL-MCNC: 97 MG/DL (ref 65–100)
HCT VFR BLD AUTO: 33.1 % (ref 41.1–50.3)
HGB BLD-MCNC: 10.4 G/DL (ref 13.6–17.2)
IMM GRANULOCYTES # BLD AUTO: 0.1 K/UL (ref 0–0.5)
IMM GRANULOCYTES NFR BLD AUTO: 1 % (ref 0–5)
LYMPHOCYTES # BLD: 3.9 K/UL (ref 0.5–4.6)
LYMPHOCYTES NFR BLD: 31 % (ref 13–44)
MCH RBC QN AUTO: 28 PG (ref 26.1–32.9)
MCHC RBC AUTO-ENTMCNC: 31.4 G/DL (ref 31.4–35)
MCV RBC AUTO: 89.2 FL (ref 82–102)
MONOCYTES # BLD: 0.8 K/UL (ref 0.1–1.3)
MONOCYTES NFR BLD: 6 % (ref 4–12)
NEUTS SEG # BLD: 6.8 K/UL (ref 1.7–8.2)
NEUTS SEG NFR BLD: 54 % (ref 43–78)
NRBC # BLD: 0 K/UL (ref 0–0.2)
PLATELET # BLD AUTO: 643 K/UL (ref 150–450)
PMV BLD AUTO: 9.6 FL (ref 9.4–12.3)
POTASSIUM SERPL-SCNC: 4.5 MMOL/L (ref 3.5–5.1)
PROT SERPL-MCNC: 7.4 G/DL (ref 6.3–8.2)
PSA SERPL-MCNC: 4.9 NG/ML
RBC # BLD AUTO: 3.71 M/UL (ref 4.23–5.6)
SODIUM SERPL-SCNC: 134 MMOL/L (ref 133–143)
WBC # BLD AUTO: 12.6 K/UL (ref 4.3–11.1)

## 2023-07-05 PROCEDURE — 3017F COLORECTAL CA SCREEN DOC REV: CPT | Performed by: INTERNAL MEDICINE

## 2023-07-05 PROCEDURE — G8427 DOCREV CUR MEDS BY ELIG CLIN: HCPCS | Performed by: INTERNAL MEDICINE

## 2023-07-05 PROCEDURE — 99214 OFFICE O/P EST MOD 30 MIN: CPT | Performed by: INTERNAL MEDICINE

## 2023-07-05 PROCEDURE — 1111F DSCHRG MED/CURRENT MED MERGE: CPT | Performed by: INTERNAL MEDICINE

## 2023-07-05 PROCEDURE — G8420 CALC BMI NORM PARAMETERS: HCPCS | Performed by: INTERNAL MEDICINE

## 2023-07-05 PROCEDURE — 1036F TOBACCO NON-USER: CPT | Performed by: INTERNAL MEDICINE

## 2023-07-05 NOTE — ASSESSMENT & PLAN NOTE
Recent evaluation by Urology and completion of course of ciprofloxacin for epididymitis with hydrocele. Symptoms have improved. Patient with no further concerns.

## 2023-07-05 NOTE — ACP (ADVANCE CARE PLANNING)
Advance Care Planning   The patient has the following advanced directives on file:  Advance Directives       Power of [address] Will ACP-Advance Directive ACP-Power of     Not on File Not on File Not on File Not on File            The patient has appointed the following active healthcare agents:    Primary Decision Maker: Regi Leon - [phone]    The Patient has the following current code status:    Code Status: Prior    Visit Documentation:  I discussed [address] [place] [address] with Nacho Wagner today which included the importance of making their choices for care and treatment in the case of a health event that adversely affects their decision-making abilities. He has not completed the Advance Care Directives. He does not have an active health care agent at this time. Nacho Wagner was encouraged to complete the declaration forms and provide a signed copy of his medical records.          Charo Meza  7/5/2023

## 2023-07-05 NOTE — ASSESSMENT & PLAN NOTE
Upper and Lower Endoscopy to be completed once sodium is corrected. Most recently 80. Checking metabolic today. Patient has been supplementing with pedialyte and with saltine crackers.

## 2023-07-05 NOTE — ASSESSMENT & PLAN NOTE
Platelets most recently 930,000 during active infection with hydrocele   He's been tolerant of full dose aspirin.

## 2023-07-07 LAB — EPO SERPL-ACNC: 29.3 MIU/ML (ref 2.6–18.5)

## 2023-07-11 ENCOUNTER — PREP FOR PROCEDURE (OUTPATIENT)
Dept: GASTROENTEROLOGY | Age: 63
End: 2023-07-11

## 2023-07-11 PROBLEM — D50.0 IRON DEFICIENCY ANEMIA DUE TO CHRONIC BLOOD LOSS: Status: ACTIVE | Noted: 2023-07-11

## 2023-07-11 PROBLEM — R63.4 WEIGHT LOSS: Status: ACTIVE | Noted: 2023-07-11

## 2023-07-11 PROBLEM — R11.2 NAUSEA AND VOMITING: Status: ACTIVE | Noted: 2023-07-11

## 2023-07-11 PROBLEM — K62.5 HEMORRHAGE OF RECTUM: Status: ACTIVE | Noted: 2023-07-11

## 2023-07-11 RX ORDER — SODIUM CHLORIDE 0.9 % (FLUSH) 0.9 %
5-40 SYRINGE (ML) INJECTION EVERY 12 HOURS SCHEDULED
Status: CANCELLED | OUTPATIENT
Start: 2023-07-11

## 2023-07-11 RX ORDER — SODIUM CHLORIDE 0.9 % (FLUSH) 0.9 %
5-40 SYRINGE (ML) INJECTION PRN
Status: CANCELLED | OUTPATIENT
Start: 2023-07-11

## 2023-07-11 RX ORDER — SODIUM CHLORIDE 9 MG/ML
25 INJECTION, SOLUTION INTRAVENOUS PRN
Status: CANCELLED | OUTPATIENT
Start: 2023-07-11

## 2023-07-18 ENCOUNTER — TELEPHONE (OUTPATIENT)
Dept: GASTROENTEROLOGY | Age: 63
End: 2023-07-18

## 2023-07-18 ENCOUNTER — OFFICE VISIT (OUTPATIENT)
Dept: RHEUMATOLOGY | Age: 63
End: 2023-07-18
Payer: MEDICARE

## 2023-07-18 VITALS
HEIGHT: 70 IN | DIASTOLIC BLOOD PRESSURE: 87 MMHG | BODY MASS INDEX: 27.06 KG/M2 | WEIGHT: 189 LBS | HEART RATE: 83 BPM | SYSTOLIC BLOOD PRESSURE: 139 MMHG

## 2023-07-18 DIAGNOSIS — M13.0 POLYARTHRITIS: Primary | ICD-10-CM

## 2023-07-18 DIAGNOSIS — M13.0 POLYARTHRITIS: ICD-10-CM

## 2023-07-18 DIAGNOSIS — R79.89 ELEVATED FERRITIN: ICD-10-CM

## 2023-07-18 LAB
CRP SERPL-MCNC: 2.4 MG/DL (ref 0–0.9)
FERRITIN SERPL-MCNC: 1022 NG/ML (ref 8–388)

## 2023-07-18 PROCEDURE — 3017F COLORECTAL CA SCREEN DOC REV: CPT | Performed by: INTERNAL MEDICINE

## 2023-07-18 PROCEDURE — 1036F TOBACCO NON-USER: CPT | Performed by: INTERNAL MEDICINE

## 2023-07-18 PROCEDURE — G8419 CALC BMI OUT NRM PARAM NOF/U: HCPCS | Performed by: INTERNAL MEDICINE

## 2023-07-18 PROCEDURE — G8427 DOCREV CUR MEDS BY ELIG CLIN: HCPCS | Performed by: INTERNAL MEDICINE

## 2023-07-18 PROCEDURE — 99204 OFFICE O/P NEW MOD 45 MIN: CPT | Performed by: INTERNAL MEDICINE

## 2023-07-18 RX ORDER — VITAMIN B COMPLEX
1 CAPSULE ORAL DAILY
COMMUNITY

## 2023-07-18 RX ORDER — ERGOCALCIFEROL 1.25 MG/1
50000 CAPSULE ORAL WEEKLY
COMMUNITY

## 2023-07-18 ASSESSMENT — ROUTINE ASSESSMENT OF PATIENT INDEX DATA (RAPID3)
ON A SCALE OF ONE TO TEN, CONSIDERING ALL THE WAYS IN WHICH ILLNESS AND HEALTH CONDITIONS MAY AFFECT YOU AT THIS TIME, PLEASE INDICATE BELOW HOW YOU ARE DOING:: 8
ON A SCALE OF ONE TO TEN, HOW MUCH PAIN HAVE YOU HAD BECAUSE OF YOUR CONDITION OVER THE PAST WEEK?: 5
WHEN YOU AWAKENED IN THE MORNING OVER THE LAST WEEK, PLEASE INDICATE THE AMOUNT OF TIME IT TAKES UNTIL YOU ARE AS LIMBER AS YOU WILL BE FOR THE DAY: 30 MIN
ON A SCALE OF ONE TO TEN, HOW MUCH OF A PROBLEM HAS UNUSUAL FATIGUE OR TIREDNESS BEEN FOR YOU OVER THE PAST WEEK?: 8
ON A SCALE OF ONE TO TEN, HOW DIFFICULT WAS IT FOR YOU TO COMPLETE THE LISTED DAILY PHYSICAL TASKS OVER THE LAST WEEK: 1.0

## 2023-07-18 ASSESSMENT — JOINT PAIN
TOTAL NUMBER OF SWOLLEN JOINTS: 2
TOTAL NUMBER OF TENDER JOINTS: 12

## 2023-07-18 NOTE — PROGRESS NOTES
Micaela Abbasi M.D.  Naomi Hartman. [address], [address] [facility]  Office : [phone], Fax: [phone]      2023    Dear Ms. Tammie Arcos you for asking me to assist in the care of your patient Edwar Monge who was seen in my office on 2023 for evaluation of joint pain with an elevated CRP and ESR. I have included the full consultation note below. I will continue to follow your patient and will forward all future office visit notes to you. If you have any questions or concerns about any aspect of your patient's care, please do not hesitate to contact me. I look forward to continuing to care for this patient with you. Sincerely,    Dr. Jacob Mays  Date of Visit:  2023 9:46 AM    Patient Information:  Name:  Edwar Monge  :  1960  Age:  61 y.o. Gender:  male    PHYSICIAN REQUESTING CONSULTATION:  Vinny Thomas. Chief Complaint:  Chief Complaint   Patient presents with    Consultation    Joint Pain    Abnormal Test Results     History of Present Illness:  Edwar Monge is a 61 y.o. male who was referred for evaluation of joint pain with an elevated ESR of 54 and an elevated CRP of 19. 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 Patient states that he is scheduled to see the hematologist on 23. On talking to him further he states that he has had drenching night sweats at night for 6 weeks prior to the admission to the hospital.  On talking to him further he states that in  he was treated for colon cancer and had to have a partial resection followed by chemotherapy to treat the cancer.    He states he has had Polo as a

## 2023-07-19 LAB — ERYTHROCYTE [SEDIMENTATION RATE] IN BLOOD: 49 MM/HR

## 2023-07-28 ENCOUNTER — OFFICE VISIT (OUTPATIENT)
Dept: UROLOGY | Age: 63
End: 2023-07-28
Payer: MEDICARE

## 2023-07-28 DIAGNOSIS — R97.20 ELEVATED PSA: Primary | ICD-10-CM

## 2023-07-28 LAB
BILIRUBIN, URINE, POC: NEGATIVE
BLOOD URINE, POC: NORMAL
GLUCOSE URINE, POC: NEGATIVE
KETONES, URINE, POC: NEGATIVE
LEUKOCYTE ESTERASE, URINE, POC: NEGATIVE
NITRITE, URINE, POC: NEGATIVE
PH, URINE, POC: 5.5 (ref 4.6–8)
PROTEIN,URINE, POC: NEGATIVE
SPECIFIC GRAVITY, URINE, POC: 1.03 (ref 1–1.03)
URINALYSIS CLARITY, POC: NORMAL
URINALYSIS COLOR, POC: NORMAL
UROBILINOGEN, POC: NORMAL

## 2023-07-28 PROCEDURE — G8419 CALC BMI OUT NRM PARAM NOF/U: HCPCS | Performed by: NURSE PRACTITIONER

## 2023-07-28 PROCEDURE — 3017F COLORECTAL CA SCREEN DOC REV: CPT | Performed by: NURSE PRACTITIONER

## 2023-07-28 PROCEDURE — G8427 DOCREV CUR MEDS BY ELIG CLIN: HCPCS | Performed by: NURSE PRACTITIONER

## 2023-07-28 PROCEDURE — 1036F TOBACCO NON-USER: CPT | Performed by: NURSE PRACTITIONER

## 2023-07-28 PROCEDURE — 81003 URINALYSIS AUTO W/O SCOPE: CPT | Performed by: NURSE PRACTITIONER

## 2023-07-28 PROCEDURE — 99213 OFFICE O/P EST LOW 20 MIN: CPT | Performed by: NURSE PRACTITIONER

## 2023-07-28 SDOH — HEALTH STABILITY: PHYSICAL HEALTH
ON AVERAGE, HOW MANY DAYS PER WEEK DO YOU ENGAGE IN MODERATE TO STRENUOUS EXERCISE (LIKE A BRISK WALK)?: PATIENT DECLINED

## 2023-07-28 ASSESSMENT — PATIENT HEALTH QUESTIONNAIRE - PHQ9
SUM OF ALL RESPONSES TO PHQ QUESTIONS 1-9: 0
1. LITTLE INTEREST OR PLEASURE IN DOING THINGS: 0
2. FEELING DOWN, DEPRESSED OR HOPELESS: 0
SUM OF ALL RESPONSES TO PHQ QUESTIONS 1-9: 0
SUM OF ALL RESPONSES TO PHQ9 QUESTIONS 1 & 2: 0

## 2023-07-28 ASSESSMENT — LIFESTYLE VARIABLES
HOW MANY STANDARD DRINKS CONTAINING ALCOHOL DO YOU HAVE ON A TYPICAL DAY: 0
HOW OFTEN DO YOU HAVE A DRINK CONTAINING ALCOHOL: NEVER
HOW OFTEN DO YOU HAVE SIX OR MORE DRINKS ON ONE OCCASION: 1
HOW MANY STANDARD DRINKS CONTAINING ALCOHOL DO YOU HAVE ON A TYPICAL DAY: PATIENT DOES NOT DRINK
HOW OFTEN DO YOU HAVE A DRINK CONTAINING ALCOHOL: 1

## 2023-07-28 NOTE — PROGRESS NOTES
[facility]  [address]  [phone]    Dannielle Bran  : 1960    Chief Complaint   Patient presents with    Follow-up     Fu PSA          HPI     Dannielle Bran is a 61 y.o. male  with a PMH of colon cancer admitted for fatigue and concern for infection. Urology consulted for L testicle pain, elevated PSA and scrotal mass. He reports 1 week history of acute onset L testicle pain with increased warmth to the area. Pain is severe. He was started on antibiotics in the hospital which has helped his pain. He also reports palpable mass behind testicle around the same time that pain started. Denies fever. No worsening LUTS. No known cause for pain. OMARI showed left epididymitis and left hydrocele. Urine culture negative. G/C/T negative. He was tx w IV rocephin then transitioned to Cipro at [place]. Seen 23. He reported he has finished Cipro. Reports sig improvement in sx. No c/o pain. He cont to run low grade temps, however this was present prior to epididymitis and is currently awaiting referral to rheumatology for to further eval fever and joint pain. As for his PSA, he has never had biopsy/MRI prostate. No FH of CAP. PSA 3.6 in 2020 and up to 4.5 this year. Has not had other PSAs in the past.  Most recent PSA appears to be in the middle of UTI episode. PSA was repeated on 23 at 4.9. He denies all LUTS today. Will be starting methotrexate soon. [place] on .            Past Medical History:   Diagnosis Date    Colon cancer [facility])     Neuropathy 2008    Osteoarthritis     Polio     Restless legs syndrome      Past Surgical History:   Procedure Laterality Date    EXCISIONAL HEMORRHOIDECTOMY      HERNIA REPAIR      LEG SURGERY Left      Current Outpatient Medications   Medication Sig Dispense Refill    vitamin D (ERGOCALCIFEROL) 1.25 MG (36863 UT) CAPS capsule Take 1 capsule by mouth once a week      b complex

## 2023-07-31 ENCOUNTER — PROCEDURE VISIT (OUTPATIENT)
Dept: NEUROLOGY | Age: 63
End: 2023-07-31
Payer: MEDICARE

## 2023-07-31 VITALS — OXYGEN SATURATION: 91 % | HEIGHT: 70 IN | WEIGHT: 195 LBS | HEART RATE: 68 BPM | BODY MASS INDEX: 27.92 KG/M2

## 2023-07-31 DIAGNOSIS — G62.9 AXONAL POLYNEUROPATHY: Primary | ICD-10-CM

## 2023-07-31 DIAGNOSIS — R20.2 NUMBNESS AND TINGLING IN BOTH HANDS: ICD-10-CM

## 2023-07-31 DIAGNOSIS — R20.0 NUMBNESS AND TINGLING OF BOTH FEET: ICD-10-CM

## 2023-07-31 DIAGNOSIS — R20.2 NUMBNESS AND TINGLING OF BOTH FEET: ICD-10-CM

## 2023-07-31 DIAGNOSIS — R20.0 NUMBNESS AND TINGLING IN BOTH HANDS: ICD-10-CM

## 2023-07-31 DIAGNOSIS — Z86.12 HX OF POST-POLIO SYNDROME: ICD-10-CM

## 2023-07-31 PROCEDURE — 95885 MUSC TST DONE W/NERV TST LIM: CPT | Performed by: PSYCHIATRY & NEUROLOGY

## 2023-07-31 PROCEDURE — 95913 NRV CNDJ TEST 13/> STUDIES: CPT | Performed by: PSYCHIATRY & NEUROLOGY

## 2023-07-31 NOTE — PROGRESS NOTES
[address] [address] [address], [address], [address]       Nerve Conduction Study and Electromyogram Report           Hx: 61 y.o. male with complaint of numbness tingling of bilateral upper and lower extremities developed after a chemotherapy in 2005 for colon cancer. Patient is prediabetic. No significant lower back pain. History of left lower extremity polio at his infant age. Had recent Still syndrome flare up unable to get up from the bed with pain, weakness and forearm swelling, now back to normal, no trouble to arise from chair. Clinical summary was reviewed. Neurological examination: Motor power showed able to move bilateral upper and lower limbs normally except the left foot weakness from polio involving the left foot dorsiflexion and foot plantarflexion 2/5. There was mild atrophy in the left lower leg. There were no fasciculations. Light touch was present in upper and lower limbs. Gait baseline lumping. Description: Nerve conduction studies were performed on the left upper extremity, right lower extremity, and left lower extremity, using standard technique. Left median, ulnar and radial sensory amplitudes and CVs were reduced. Left transcarpal study showed median peak latency delay by 0.48 MS. Bilateral sural response was absent. Left peroneal and tibial motor nerves showed no response (polio side); right peroneal and tibial motor nerves showed reduced amplitudes with normal CV. The motor distal latency mildly prolonged 4.42 MS, amplitude normal, CV borderline normal 50. Left ulnar motor latencies and amplitudes were normal, CV normal on ADM and mildly reduced on FDI 44/44. Tendencies borderline on lower 58.5 and 59.0 MS, round on upper 32.1 and 32.7 MS. H reflex response was present bilaterally but mildly prolonged on both sides, waveforms better on the right.   Left peroneal segmental study recorded from tibialis anterior showed

## 2023-08-01 ENCOUNTER — TELEPHONE (OUTPATIENT)
Dept: INTERNAL MEDICINE CLINIC | Facility: CLINIC | Age: 63
End: 2023-08-01

## 2023-08-01 ENCOUNTER — OFFICE VISIT (OUTPATIENT)
Dept: INTERNAL MEDICINE CLINIC | Facility: CLINIC | Age: 63
End: 2023-08-01
Payer: MEDICARE

## 2023-08-01 VITALS
TEMPERATURE: 98.4 F | WEIGHT: 196 LBS | HEIGHT: 70 IN | BODY MASS INDEX: 28.06 KG/M2 | DIASTOLIC BLOOD PRESSURE: 100 MMHG | SYSTOLIC BLOOD PRESSURE: 158 MMHG

## 2023-08-01 DIAGNOSIS — R93.3 ABNORMAL FINDINGS ON DIAGNOSTIC IMAGING OF OTHER PARTS OF DIGESTIVE TRACT: ICD-10-CM

## 2023-08-01 DIAGNOSIS — Z13.220 LIPID SCREENING: ICD-10-CM

## 2023-08-01 DIAGNOSIS — C18.7 CANCER OF SIGMOID COLON (HCC): ICD-10-CM

## 2023-08-01 DIAGNOSIS — G62.9 AXONAL POLYNEUROPATHY: ICD-10-CM

## 2023-08-01 DIAGNOSIS — R03.0 ELEVATED BLOOD PRESSURE READING: ICD-10-CM

## 2023-08-01 DIAGNOSIS — F41.1 GENERALIZED ANXIETY DISORDER: Primary | ICD-10-CM

## 2023-08-01 DIAGNOSIS — Z12.5 ENCOUNTER FOR SCREENING FOR MALIGNANT NEOPLASM OF PROSTATE: ICD-10-CM

## 2023-08-01 DIAGNOSIS — H53.9 VISION CHANGES: ICD-10-CM

## 2023-08-01 PROCEDURE — G8419 CALC BMI OUT NRM PARAM NOF/U: HCPCS | Performed by: INTERNAL MEDICINE

## 2023-08-01 PROCEDURE — G0439 PPPS, SUBSEQ VISIT: HCPCS | Performed by: INTERNAL MEDICINE

## 2023-08-01 PROCEDURE — 99214 OFFICE O/P EST MOD 30 MIN: CPT | Performed by: INTERNAL MEDICINE

## 2023-08-01 PROCEDURE — 3017F COLORECTAL CA SCREEN DOC REV: CPT | Performed by: INTERNAL MEDICINE

## 2023-08-01 PROCEDURE — 1036F TOBACCO NON-USER: CPT | Performed by: INTERNAL MEDICINE

## 2023-08-01 PROCEDURE — G8427 DOCREV CUR MEDS BY ELIG CLIN: HCPCS | Performed by: INTERNAL MEDICINE

## 2023-08-01 SDOH — ECONOMIC STABILITY: FOOD INSECURITY: WITHIN THE PAST 12 MONTHS, YOU WORRIED THAT YOUR FOOD WOULD RUN OUT BEFORE YOU GOT MONEY TO BUY MORE.: NEVER TRUE

## 2023-08-01 SDOH — ECONOMIC STABILITY: INCOME INSECURITY: HOW HARD IS IT FOR YOU TO PAY FOR THE VERY BASICS LIKE FOOD, HOUSING, MEDICAL CARE, AND HEATING?: NOT HARD AT ALL

## 2023-08-01 SDOH — ECONOMIC STABILITY: FOOD INSECURITY: WITHIN THE PAST 12 MONTHS, THE FOOD YOU BOUGHT JUST DIDN'T LAST AND YOU DIDN'T HAVE MONEY TO GET MORE.: NEVER TRUE

## 2023-08-01 ASSESSMENT — ANXIETY QUESTIONNAIRES
3. WORRYING TOO MUCH ABOUT DIFFERENT THINGS: 0
4. TROUBLE RELAXING: 0
7. FEELING AFRAID AS IF SOMETHING AWFUL MIGHT HAPPEN: 0
5. BEING SO RESTLESS THAT IT IS HARD TO SIT STILL: 0
1. FEELING NERVOUS, ANXIOUS, OR ON EDGE: 0
IF YOU CHECKED OFF ANY PROBLEMS ON THIS QUESTIONNAIRE, HOW DIFFICULT HAVE THESE PROBLEMS MADE IT FOR YOU TO DO YOUR WORK, TAKE CARE OF THINGS AT HOME, OR GET ALONG WITH OTHER PEOPLE: NOT DIFFICULT AT ALL
2. NOT BEING ABLE TO STOP OR CONTROL WORRYING: 0
GAD7 TOTAL SCORE: 1
6. BECOMING EASILY ANNOYED OR IRRITABLE: 1

## 2023-08-01 NOTE — TELEPHONE ENCOUNTER
Received discharge information from [facility] for patient. Dr. Taylor Murphy signed last page of forms and they were faxed back to [phone].   Fax confirmation was received

## 2023-08-03 ENCOUNTER — OFFICE VISIT (OUTPATIENT)
Dept: RHEUMATOLOGY | Age: 63
End: 2023-08-03
Payer: MEDICARE

## 2023-08-03 ENCOUNTER — NURSE ONLY (OUTPATIENT)
Dept: INTERNAL MEDICINE CLINIC | Facility: CLINIC | Age: 63
End: 2023-08-03

## 2023-08-03 VITALS
BODY MASS INDEX: 28.63 KG/M2 | SYSTOLIC BLOOD PRESSURE: 148 MMHG | DIASTOLIC BLOOD PRESSURE: 97 MMHG | WEIGHT: 200 LBS | HEIGHT: 70 IN | HEART RATE: 78 BPM

## 2023-08-03 DIAGNOSIS — Z13.220 LIPID SCREENING: ICD-10-CM

## 2023-08-03 DIAGNOSIS — M06.1 STILL'S DISEASE OF ADULT (HCC): Primary | ICD-10-CM

## 2023-08-03 DIAGNOSIS — Z71.89 ENCOUNTER FOR MEDICATION REVIEW AND COUNSELING: ICD-10-CM

## 2023-08-03 DIAGNOSIS — R93.3 ABNORMAL FINDINGS ON DIAGNOSTIC IMAGING OF OTHER PARTS OF DIGESTIVE TRACT: ICD-10-CM

## 2023-08-03 DIAGNOSIS — C18.7 CANCER OF SIGMOID COLON (HCC): ICD-10-CM

## 2023-08-03 DIAGNOSIS — Z12.5 ENCOUNTER FOR SCREENING FOR MALIGNANT NEOPLASM OF PROSTATE: ICD-10-CM

## 2023-08-03 DIAGNOSIS — R03.0 ELEVATED BLOOD PRESSURE READING: ICD-10-CM

## 2023-08-03 LAB
CHOLEST SERPL-MCNC: 193 MG/DL
HDLC SERPL-MCNC: 48 MG/DL (ref 40–60)
HDLC SERPL: 4
LDLC SERPL CALC-MCNC: 117 MG/DL
PSA SERPL-MCNC: 5 NG/ML
TRIGL SERPL-MCNC: 140 MG/DL (ref 35–150)
VLDLC SERPL CALC-MCNC: 28 MG/DL (ref 6–23)

## 2023-08-03 PROCEDURE — 3017F COLORECTAL CA SCREEN DOC REV: CPT | Performed by: INTERNAL MEDICINE

## 2023-08-03 PROCEDURE — G8419 CALC BMI OUT NRM PARAM NOF/U: HCPCS | Performed by: INTERNAL MEDICINE

## 2023-08-03 PROCEDURE — 1036F TOBACCO NON-USER: CPT | Performed by: INTERNAL MEDICINE

## 2023-08-03 PROCEDURE — 99215 OFFICE O/P EST HI 40 MIN: CPT | Performed by: INTERNAL MEDICINE

## 2023-08-03 PROCEDURE — G8427 DOCREV CUR MEDS BY ELIG CLIN: HCPCS | Performed by: INTERNAL MEDICINE

## 2023-08-03 RX ORDER — FOLIC ACID 1 MG/1
1 TABLET ORAL DAILY
Qty: 30 TABLET | Refills: 2 | Status: SHIPPED | OUTPATIENT
Start: 2023-08-03

## 2023-08-03 ASSESSMENT — ROUTINE ASSESSMENT OF PATIENT INDEX DATA (RAPID3)
ON A SCALE OF ONE TO TEN, HOW MUCH PAIN HAVE YOU HAD BECAUSE OF YOUR CONDITION OVER THE PAST WEEK?: 5
ON A SCALE OF ONE TO TEN, HOW MUCH OF A PROBLEM HAS UNUSUAL FATIGUE OR TIREDNESS BEEN FOR YOU OVER THE PAST WEEK?: 5
ON A SCALE OF ONE TO TEN, CONSIDERING ALL THE WAYS IN WHICH ILLNESS AND HEALTH CONDITIONS MAY AFFECT YOU AT THIS TIME, PLEASE INDICATE BELOW HOW YOU ARE DOING:: 5
WHEN YOU AWAKENED IN THE MORNING OVER THE LAST WEEK, PLEASE INDICATE THE AMOUNT OF TIME IT TAKES UNTIL YOU ARE AS LIMBER AS YOU WILL BE FOR THE DAY: 15 MIN
ON A SCALE OF ONE TO TEN, HOW DIFFICULT WAS IT FOR YOU TO COMPLETE THE LISTED DAILY PHYSICAL TASKS OVER THE LAST WEEK: 0.8

## 2023-08-03 ASSESSMENT — JOINT PAIN
TOTAL NUMBER OF SWOLLEN JOINTS: 0
TOTAL NUMBER OF TENDER JOINTS: 9

## 2023-08-03 NOTE — PROGRESS NOTES
Triny Arriola M.D.  [address] [place] [address]., [address] [place] [address], [address] [place] [address]  Office : [phone], Fax: [phone] OFFICE VISIT NOTE  Date of Visit:  8/3/2023 12:55 PM    Patient Information:  Name:  Dannielle Bran  :  1960  Age:  61 y.o. Gender:  male      Mr. Cecily Goldstein is here today for follow-up to review lab results from the last visit and start methotrexate. Last visit:      History of Present Illness: On talking to the patient today he states that he is willing to start the methotrexate. All questions and concerns regarding the drug were discussed with the patient in length today. With regard to his elevated blood pressure he states that at home his BP this AM was 133/78. His current joint complaints are as mentioned below. Since the last visit, patient is feeling \"fair\". Pain: 5/10  Location:  Some left thumb pain with swelling with no warmth and redness. Some right 3rd and 4th MCP and PIP joints. Bilateral ankle swelling with no pain, warmth and redness. Bilateral elbow pain with no shoulder pain. No swelling of the elbows. Some neck stiffness with pain with neck ROM. Some lower back pain. Quality:  Sharp to deep achy pain. Modifying Factors:    Associated Symptoms: Intermittent pain, tingling and numbness from the elbows to the fingertips with intermittent pain, tingling and numbness from the hips to the toes. Some UE and LE weakness. Was told he has CTS in the left hand. No limitations with his ADL's.       DMARD/Biologic 8/3/2023   AM Stiffness 15 min   Pain 5   Fatigue 5   MDHAQ 0.8   Patient Global Score 5   Medication Name Other   Other Medication voltaren     Last TB screen:2023   TB result:negative     Current dose of steroids:none  How long on current dose of steroids:na  How long on continuous steroid therapy:na    Past DMARDs, if applicable (methotrexate, plaquenil/hydroxychloroquine,

## 2023-08-04 ENCOUNTER — PATIENT MESSAGE (OUTPATIENT)
Dept: UROLOGY | Age: 63
End: 2023-08-04

## 2023-08-04 DIAGNOSIS — M79.5 RETAINED BULLET: ICD-10-CM

## 2023-08-04 DIAGNOSIS — R97.20 ELEVATED PSA: Primary | ICD-10-CM

## 2023-08-04 PROBLEM — Z12.5 PROSTATE CANCER SCREENING: Status: RESOLVED | Noted: 2023-07-05 | Resolved: 2023-08-04

## 2023-08-07 ENCOUNTER — TELEPHONE (OUTPATIENT)
Dept: RHEUMATOLOGY | Age: 63
End: 2023-08-07

## 2023-08-07 ENCOUNTER — PROCEDURE VISIT (OUTPATIENT)
Dept: NEUROLOGY | Age: 63
End: 2023-08-07
Payer: MEDICARE

## 2023-08-07 DIAGNOSIS — R20.0 NUMBNESS AND TINGLING IN RIGHT HAND: Primary | ICD-10-CM

## 2023-08-07 DIAGNOSIS — R52 DIFFUSE PAIN: ICD-10-CM

## 2023-08-07 DIAGNOSIS — R20.2 NUMBNESS AND TINGLING IN RIGHT HAND: Primary | ICD-10-CM

## 2023-08-07 DIAGNOSIS — Z85.038 HISTORY OF COLON CANCER: ICD-10-CM

## 2023-08-07 DIAGNOSIS — G62.9 AXONAL POLYNEUROPATHY: ICD-10-CM

## 2023-08-07 DIAGNOSIS — G56.01 MILD CARPAL TUNNEL SYNDROME OF RIGHT WRIST: ICD-10-CM

## 2023-08-07 PROCEDURE — 95885 MUSC TST DONE W/NERV TST LIM: CPT | Performed by: PSYCHIATRY & NEUROLOGY

## 2023-08-07 PROCEDURE — 95910 NRV CNDJ TEST 7-8 STUDIES: CPT | Performed by: PSYCHIATRY & NEUROLOGY

## 2023-08-08 RX ORDER — CETIRIZINE HYDROCHLORIDE 10 MG/1
10 TABLET ORAL DAILY
COMMUNITY

## 2023-08-08 NOTE — TELEPHONE ENCOUNTER
----- Message from Aurora Pacheco MD sent at 10/15/2022 11:14 AM EDT -----  Notify labs are normal. F/U as needed. From: Anish Christiansen  To: Chicho Mckeon  Sent: 8/4/2023 8:31 AM EDT  Subject: Elevated PSA    Chicho Mckeon, Nico Black ([birth year]) recent visit with Primary care Dr. Blake Thomas. Labs drawn for Mr Michael Meza PSA results are at 5.0 Mr Daphene Bye history of PSA -6/9/23 (hospital) is 4.5, 7/5/23 from Dr Bia Rodriges office is 4.9, 8/3/23 from Dr Bia Rodriges office is 5.0. There is no appointment or tests scheduled for Mr Michael Meza, the lab at his last appointment with [facility] was unavailable, no personal. This information is for your update.  Thank you Nico Black

## 2023-08-08 NOTE — TELEPHONE ENCOUNTER
PA for Methotrexate:  Approved on August 7  CaseId:[id number];Status:Approved; Review Type:Prior Auth; Coverage Start Date:07/08/2023; Coverage End Date:08/06/2024; VENNCOMM message to patient with update so he can get it filled.

## 2023-08-09 NOTE — PROGRESS NOTES
[address] [address] [address] [address] [address], [address], [address]       Nerve Conduction Study and Electromyogram Report           Hx: 61 y.o. male seen nerve conduction study of the right upper limb. 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 Significant past medical history includes borderline diabetes, hx chemotherapy in 2005 for colon cancer and hx poliomyelitis at his infant age affecting left lower extremity. Denied neck pain. Clinical summary was reviewed. Neurological examination: Motor power showed normal in upper limbs. There was no atrophy. There were no fasciculations. Deep tendon reflexes were present and symmetrical at 2 in uppers. Light touch intact. Gait baseline. Description: Nerve conduction studies were performed on the right upper extremity, using standard technique. Right median sensory amplitude and CV were mildly reduced. Right ulnar sensory amplitude reduced with normal CV . right radial sensory mildly reduced with normal CV . transcarpal study showed median ulnar amplitude increased, median peak latency delay by 0.71 MS. Right median and ulnar ADM motor nerves are normal.  F-wave latencies were borderline. Needle electromyography was performed on bilateral distal muscles including APB, FDI and EIP, using standard concentric electrodes. Results were normal.         Conclusion: This study showed neurophysiologic evidence of several abnormalities 1) carpal tunnel syndrome on the tested side, right side, mild in degree. 2) distal sensory polyneuropathy in upper limbs. Needle EMG to bilateral APB, FDI and EIP was normal showing no evidence of axonal loss from median or ulnar mononeuropathy.   No evidence of C8-T1 radiculopathy or lower trunk/medial cord

## 2023-08-11 NOTE — PROGRESS NOTES
RN called Pt to confirm appointment time of 0945, arrival time 0815, location,  requirement, and instructions for registration at the hospital.  Pt verbalized understanding.

## 2023-08-14 ENCOUNTER — HOSPITAL ENCOUNTER (OUTPATIENT)
Age: 63
Setting detail: OUTPATIENT SURGERY
Discharge: HOME | End: 2023-08-14
Attending: INTERNAL MEDICINE | Admitting: INTERNAL MEDICINE
Payer: MEDICARE

## 2023-08-14 ENCOUNTER — ANESTHESIA EVENT (OUTPATIENT)
Dept: ENDOSCOPY | Age: 63
End: 2023-08-14
Payer: MEDICARE

## 2023-08-14 ENCOUNTER — ANESTHESIA (OUTPATIENT)
Dept: ENDOSCOPY | Age: 63
End: 2023-08-14
Payer: MEDICARE

## 2023-08-14 VITALS
OXYGEN SATURATION: 96 % | BODY MASS INDEX: 27.2 KG/M2 | WEIGHT: 190 LBS | HEART RATE: 73 BPM | HEIGHT: 70 IN | TEMPERATURE: 97.7 F | RESPIRATION RATE: 18 BRPM | SYSTOLIC BLOOD PRESSURE: 122 MMHG | DIASTOLIC BLOOD PRESSURE: 80 MMHG

## 2023-08-14 DIAGNOSIS — R63.4 WEIGHT LOSS: ICD-10-CM

## 2023-08-14 DIAGNOSIS — D50.0 IRON DEFICIENCY ANEMIA DUE TO CHRONIC BLOOD LOSS: ICD-10-CM

## 2023-08-14 DIAGNOSIS — K62.5 HEMORRHAGE OF RECTUM: ICD-10-CM

## 2023-08-14 PROCEDURE — 88305 TISSUE EXAM BY PATHOLOGIST: CPT

## 2023-08-14 PROCEDURE — 2500000003 HC RX 250 WO HCPCS: Performed by: NURSE ANESTHETIST, CERTIFIED REGISTERED

## 2023-08-14 PROCEDURE — 88312 SPECIAL STAINS GROUP 1: CPT

## 2023-08-14 PROCEDURE — 7100000011 HC PHASE II RECOVERY - ADDTL 15 MIN: Performed by: INTERNAL MEDICINE

## 2023-08-14 PROCEDURE — 2709999900 HC NON-CHARGEABLE SUPPLY: Performed by: INTERNAL MEDICINE

## 2023-08-14 PROCEDURE — 3700000000 HC ANESTHESIA ATTENDED CARE: Performed by: INTERNAL MEDICINE

## 2023-08-14 PROCEDURE — 7100000010 HC PHASE II RECOVERY - FIRST 15 MIN: Performed by: INTERNAL MEDICINE

## 2023-08-14 PROCEDURE — 3700000001 HC ADD 15 MINUTES (ANESTHESIA): Performed by: INTERNAL MEDICINE

## 2023-08-14 PROCEDURE — 3609012400 HC EGD TRANSORAL BIOPSY SINGLE/MULTIPLE: Performed by: INTERNAL MEDICINE

## 2023-08-14 PROCEDURE — 6360000002 HC RX W HCPCS: Performed by: NURSE ANESTHETIST, CERTIFIED REGISTERED

## 2023-08-14 PROCEDURE — 2580000003 HC RX 258: Performed by: ANESTHESIOLOGY

## 2023-08-14 PROCEDURE — 3609027000 HC COLONOSCOPY: Performed by: INTERNAL MEDICINE

## 2023-08-14 RX ORDER — PROPOFOL 10 MG/ML
INJECTION, EMULSION INTRAVENOUS CONTINUOUS PRN
Status: DISCONTINUED | OUTPATIENT
Start: 2023-08-14 | End: 2023-08-14 | Stop reason: SDUPTHER

## 2023-08-14 RX ORDER — SODIUM CHLORIDE 0.9 % (FLUSH) 0.9 %
5-40 SYRINGE (ML) INJECTION PRN
Status: DISCONTINUED | OUTPATIENT
Start: 2023-08-14 | End: 2023-08-14 | Stop reason: HOSPADM

## 2023-08-14 RX ORDER — SODIUM CHLORIDE 9 MG/ML
INJECTION, SOLUTION INTRAVENOUS PRN
Status: DISCONTINUED | OUTPATIENT
Start: 2023-08-14 | End: 2023-08-14 | Stop reason: HOSPADM

## 2023-08-14 RX ORDER — DEXTROSE MONOHYDRATE 100 MG/ML
INJECTION, SOLUTION INTRAVENOUS
Status: DISCONTINUED
Start: 2023-08-14 | End: 2023-08-14 | Stop reason: WASHOUT

## 2023-08-14 RX ORDER — PROPOFOL 10 MG/ML
INJECTION, EMULSION INTRAVENOUS PRN
Status: DISCONTINUED | OUTPATIENT
Start: 2023-08-14 | End: 2023-08-14 | Stop reason: SDUPTHER

## 2023-08-14 RX ORDER — SODIUM CHLORIDE, SODIUM LACTATE, POTASSIUM CHLORIDE, CALCIUM CHLORIDE 600; 310; 30; 20 MG/100ML; MG/100ML; MG/100ML; MG/100ML
INJECTION, SOLUTION INTRAVENOUS CONTINUOUS
Status: CANCELLED | OUTPATIENT
Start: 2023-08-14

## 2023-08-14 RX ORDER — ONDANSETRON 2 MG/ML
4 INJECTION INTRAMUSCULAR; INTRAVENOUS
Status: CANCELLED | OUTPATIENT
Start: 2023-08-14 | End: 2023-08-15

## 2023-08-14 RX ORDER — LIDOCAINE HYDROCHLORIDE 10 MG/ML
1 INJECTION, SOLUTION INFILTRATION; PERINEURAL
Status: DISCONTINUED | OUTPATIENT
Start: 2023-08-14 | End: 2023-08-14 | Stop reason: HOSPADM

## 2023-08-14 RX ORDER — LIDOCAINE HYDROCHLORIDE 20 MG/ML
INJECTION, SOLUTION EPIDURAL; INFILTRATION; INTRACAUDAL; PERINEURAL PRN
Status: DISCONTINUED | OUTPATIENT
Start: 2023-08-14 | End: 2023-08-14 | Stop reason: SDUPTHER

## 2023-08-14 RX ORDER — SODIUM CHLORIDE 0.9 % (FLUSH) 0.9 %
5-40 SYRINGE (ML) INJECTION EVERY 12 HOURS SCHEDULED
Status: DISCONTINUED | OUTPATIENT
Start: 2023-08-14 | End: 2023-08-14 | Stop reason: HOSPADM

## 2023-08-14 RX ORDER — SODIUM CHLORIDE, SODIUM LACTATE, POTASSIUM CHLORIDE, CALCIUM CHLORIDE 600; 310; 30; 20 MG/100ML; MG/100ML; MG/100ML; MG/100ML
INJECTION, SOLUTION INTRAVENOUS CONTINUOUS
Status: DISCONTINUED | OUTPATIENT
Start: 2023-08-14 | End: 2023-08-14 | Stop reason: HOSPADM

## 2023-08-14 RX ADMIN — LIDOCAINE HYDROCHLORIDE 60 MG: 20 INJECTION, SOLUTION EPIDURAL; INFILTRATION; INTRACAUDAL; PERINEURAL at 08:40

## 2023-08-14 RX ADMIN — PROPOFOL 160 MCG/KG/MIN: 10 INJECTION, EMULSION INTRAVENOUS at 08:40

## 2023-08-14 RX ADMIN — SODIUM CHLORIDE, POTASSIUM CHLORIDE, SODIUM LACTATE AND CALCIUM CHLORIDE: 600; 310; 30; 20 INJECTION, SOLUTION INTRAVENOUS at 07:53

## 2023-08-14 RX ADMIN — PROPOFOL 50 MG: 10 INJECTION, EMULSION INTRAVENOUS at 08:42

## 2023-08-14 RX ADMIN — PROPOFOL 50 MG: 10 INJECTION, EMULSION INTRAVENOUS at 08:40

## 2023-08-14 ASSESSMENT — ENCOUNTER SYMPTOMS
VOMITING: 1
NAUSEA: 1
ANAL BLEEDING: 1

## 2023-08-14 ASSESSMENT — PAIN SCALES - GENERAL
PAINLEVEL_OUTOF10: 0

## 2023-08-14 ASSESSMENT — LIFESTYLE VARIABLES: SMOKING_STATUS: 0

## 2023-08-14 ASSESSMENT — PAIN - FUNCTIONAL ASSESSMENT: PAIN_FUNCTIONAL_ASSESSMENT: NONE - DENIES PAIN

## 2023-08-14 NOTE — PROCEDURES
Endoscopy Note          Operative Report    Patient: Taylor Garcia MRN: [id number]      YOB: 1960  Age: 61 y.o. Sex: male            Indications:   Nausea vomiting. Epigastric abdominal pain    Preoperative Evaluation: The patient was evaluated prior to the procedure in the GI lab admission area, the patient ASA was recorded . Consent was obtained from the patient with the risk of perforation bleeding and aspiration. Anesthesia: PAULETTE-per anesthesia    Complication: None; patient tolerated the procedure well. Estimated blood loss: insignificant    Procedure: The patient was sedated into the left lateral decubitus position. Scope was advanced from the mouth to the third portion of duodenum. Scope was withdrawn to the stomach and retroflexed view was performed. Esophageal examination was performed. Findings: Normal upper mid and distal esophageal mucosa. Normal GE junction. Severe erosive deep antral gastritis biopsy was taken from the antrum. Open pylorus. Bulb erosions noted. Normal descending duodenum.     Postoperative Diagnosis: Severe erosive gastritis    28764 Esophagogastroduodenoscopy, Flexible, transoral; biopsy; single or multiple      Recommendations: Await Pathology      Signed By:  Teofilo Jean MD     August 14, 2023

## 2023-08-14 NOTE — PROCEDURES
Operative Report    Patient: Judie Oshea MRN: [id number]      YOB: 1960  Age: 61 y.o. Sex: male            Indications: Rectal bleeding. History of sigmoid [email or web address]     Preoperative Evaluation: The patient was evaluated prior to the procedure in the GI lab admission area, the patient ASA was recorded . Consent was obtained from the patient with the risk of perforation bleeding and aspiration. Anesthesia: PAULETTE-per anesthesia    Complications: None; patient tolerated the procedure well. EBL -insignificant      Procedure: The patient was sedated in the left lateral decubitus position. Scope was advanced from the rectum to the cecum. Evaluation was performed to the cecum twice. The scope was withdrawn to the rectum, retroflexed view was performed. The rectal exam was normal.  Preparation was good Iroquois score of 3/3/3:9 . Findings:   Normal cecal mucosa. Normal ascending transverse descending colon status post sigmoid resection with normal colorectal anastomosis. Large external hemorrhoid with internal component and a large anal papilla most likely source of bleeding      Postoperative Diagnosis: Large internal/external hemorrhoid if he continues to bleed recommend surgical hemorrhoidectomy. No sign of recurrent polyp secondary to his prior history of sigmoid cancer recommend repeat exam in 1 year    83864 Colonoscopy, Flexible; diagnostic       Recommendations:   - Follow clinical symptoms and laboratory studies for evidence of rebleeding.       Signed By:  Amadeo Gallardo MD     August 14, 2023
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
## 2023-08-14 NOTE — DISCHARGE INSTRUCTIONS
Gastrointestinal Esophagogastroduodenoscopy (EGD) - Upper Exam Discharge Instructions    1. Call Dr. Kamilla Keating at [phone] for any problems or questions. 2. Contact the doctor's office for follow up appointment as directed. 3. Medication may cause drowsiness for several hours, therefore:  Do not drive or operate machinery for remainder of the day. No alcohol today. Do not make any important or legal decisions for 24 hours. Do not sign any legal documents for 24 hours. 5. Ordinarily, you may resume regular diet and activity after exam unless otherwise specified by your physician. 6. For mild soreness in your throat you may use Cepacol throat lozenges or warm salt-water gargles as needed. Gastrointestinal Colonoscopy/Flexible Sigmoidoscopy - Lower Exam Discharge Instructions    Medication may cause drowsiness for several hours, therefore, do not drive or operate machinery for remainder of the day. No alcohol today. Ordinarily, you may resume regular diet and activity after exam unless otherwise specified by your physician. Because of air put into your colon during exam, you may experience some abdominal distension, relieved by the passage of gas, for several hours. Contact your physician if you have any of the following:  Excessive amount of bleeding - large amount when having a bowel movement. Occasional specks of blood with bowel movement would not be unusual.  Severe abdominal pain  Fever or Chills    Any additional instructions: Follow up as directed.

## 2023-08-14 NOTE — ANESTHESIA PRE PROCEDURE
Department of Anesthesiology  Preprocedure Note       Name:  Shira Saba   Age:  61 y.o.  :  1960                                          MRN:  [id number]         Date:  2023      Surgeon: Adithya Hoskins):  Sulma Gastelum MD    Procedure: Procedure(s):  COLORECTAL CANCER SCREENING, NOT HIGH RISK  EGD ESOPHAGOGASTRODUODENOSCOPY    Medications prior to admission:   Prior to Admission medications    Medication Sig Start Date End Date Taking? Authorizing Provider   cetirizine (ZYRTEC) 10 MG tablet Take 1 tablet by mouth daily   Yes Historical Provider, MD   LORazepam (ATIVAN PO) Take by mouth as needed   Yes Historical Provider, MD   Cholecalciferol (VITAMIN D3 PO) Take by mouth daily   Yes Historical Provider, MD   methotrexate (RHEUMATREX) 2.5 MG chemo tablet Take 6 pills once a week after supper. Patient taking differently: Take 6 pills once a week after supper.   To start 8/17/23 8/3/23   Nii Carrizales MD   folic acid (FOLVITE) 1 MG tablet Take 1 tablet by mouth daily 8/3/23   Nii Carrizales MD   b complex vitamins capsule Take 1 capsule by mouth daily    Historical Provider, MD   Cyanocobalamin (VITAMIN B-12) 5000 MCG SUBL Place under the tongue Once daily    Historical Provider, MD   Aspirin Buf,CcNbb-BuWyg-WxXsb, (BUFFERED ASPIRIN) 325 MG TABS Take 1 tablet by mouth daily 23   Mary Telles DO   diclofenac (VOLTAREN) 50 MG EC tablet Take 1 tablet by mouth 3 times daily (with meals) 23   Mary Telles DO   amitriptyline (ELAVIL) 25 MG tablet Take 1 tablet by mouth nightly 23   Mary Telles DO   fluticasone Mohinder Natasha) 50 MCG/ACT nasal spray 1 spray by Nasal route daily  Patient taking differently: 1 spray by Nasal route 2 times daily 23   Mary Telles DO   Multiple Vitamins-Minerals (THERAPEUTIC MULTIVITAMIN-MINERALS) tablet Take 1 tablet by mouth daily    Historical Provider, MD   calcium carbonate (TUMS) 500 MG chewable tablet Take 1 tablet by mouth daily as needed

## 2023-08-14 NOTE — PROGRESS NOTES
0925-Discharge instructions were reviewed with patient's spouse, Estephania Monroe. An opportunity was given for questions. Patient and Estephania Monroe verbalized understanding, and has no questions at this time. 0942-To lobby via wheelchair accompanied by staff. Discharged to home in good condition via private vehicle.

## 2023-08-17 ENCOUNTER — TELEPHONE (OUTPATIENT)
Dept: GASTROENTEROLOGY | Age: 63
End: 2023-08-17

## 2023-08-17 NOTE — TELEPHONE ENCOUNTER
Called patient with EGD/colonoscopy results:    DIAGNOSIS        \"ANTRAL BIOPSIES\":  MILD CHRONIC GASTRITIS WITHOUT ACTIVITY; MILD   REPARATIVE CHANGES. Findings:   Normal cecal mucosa. Normal ascending transverse descending colon status post sigmoid resection with normal colorectal anastomosis. Large external hemorrhoid with internal component and a large anal papilla most likely source of bleeding     Postoperative Diagnosis: Large internal/external hemorrhoid if he continues to bleed recommend surgical hemorrhoidectomy. No sign of recurrent polyp secondary to his prior history of sigmoid cancer recommend repeat exam in 1 year     Recommendations:   - Follow clinical symptoms and laboratory studies for evidence of rebleeding    Findings: Normal upper mid and distal esophageal mucosa. Normal GE junction. Severe erosive deep antral gastritis biopsy was taken from the antrum. Open pylorus. Bulb erosions noted. Normal descending duodenum. Postoperative Diagnosis: Severe erosive gastritis    Results reviewed with patient. Appointment made for 8/21 @8:45 to discuss findings/treatment plan. Repeat colonoscopy set up for a 1 year recall due to colon cancer history.

## 2023-08-21 ENCOUNTER — OFFICE VISIT (OUTPATIENT)
Dept: GASTROENTEROLOGY | Age: 63
End: 2023-08-21
Payer: MEDICARE

## 2023-08-21 ENCOUNTER — HOSPITAL ENCOUNTER (OUTPATIENT)
Dept: GENERAL RADIOLOGY | Age: 63
Discharge: HOME | End: 2023-08-24
Payer: MEDICARE

## 2023-08-21 VITALS
WEIGHT: 192 LBS | HEIGHT: 70 IN | RESPIRATION RATE: 17 BRPM | HEART RATE: 66 BPM | BODY MASS INDEX: 27.49 KG/M2 | TEMPERATURE: 97.2 F | DIASTOLIC BLOOD PRESSURE: 96 MMHG | SYSTOLIC BLOOD PRESSURE: 158 MMHG | OXYGEN SATURATION: 97 %

## 2023-08-21 DIAGNOSIS — K29.60 EROSIVE GASTRITIS: ICD-10-CM

## 2023-08-21 DIAGNOSIS — K64.9 HEMORRHOIDS, UNSPECIFIED HEMORRHOID TYPE: ICD-10-CM

## 2023-08-21 DIAGNOSIS — R97.20 ELEVATED PSA: Primary | ICD-10-CM

## 2023-08-21 DIAGNOSIS — M79.5 RETAINED BULLET: ICD-10-CM

## 2023-08-21 PROCEDURE — 70260 X-RAY EXAM OF SKULL: CPT

## 2023-08-21 PROCEDURE — 99214 OFFICE O/P EST MOD 30 MIN: CPT | Performed by: PHYSICIAN ASSISTANT

## 2023-08-21 RX ORDER — PANTOPRAZOLE SODIUM 40 MG/1
40 TABLET, DELAYED RELEASE ORAL DAILY
Qty: 60 TABLET | Refills: 0 | Status: SHIPPED | OUTPATIENT
Start: 2023-08-21 | End: 2023-10-20

## 2023-08-21 RX ORDER — HYDROCORTISONE ACETATE 25 MG/1
25 SUPPOSITORY RECTAL EVERY 12 HOURS
Qty: 14 SUPPOSITORY | Refills: 0 | Status: CANCELLED | OUTPATIENT
Start: 2023-08-21 | End: 2023-08-28

## 2023-08-21 RX ORDER — SUCRALFATE 1 G/1
1 TABLET ORAL
Qty: 120 TABLET | Refills: 0 | Status: SHIPPED | OUTPATIENT
Start: 2023-08-21 | End: 2023-09-20

## 2023-08-21 NOTE — ASSESSMENT & PLAN NOTE
Noted on colonoscopy 8/14. Counseled patient and provided written recommendations for diet and lifestyle modifications for GERD. Avoid tobacco, alcohol, NSAIDs. Denies significant reflux symptoms. Start PPI QD x 8 weeks and Carafate 1 g QID x 4 weeks.

## 2023-08-21 NOTE — PATIENT INSTRUCTIONS
For reflux:   Eat smaller and more frequent meals. Avoid lying down for 3 hours after eating. Elevate the head of the bed by 6 inches. Avoid wearing tight-fitting clothing. Stop smoking and avoid alcohol. Avoid NSAID medications (Aspirin, Excedrin, Aleve, Ibuprofen, Goody Powder, Toradol, Mobic, Voltaren, etc). Consider weight loss to get to a healthy weight, which is often critical to eliminating symptoms of reflux. Avoid foods and acid-containing beverages that can exacerbate the symptoms of reflux. This includes:     -Caffeine  -Chocolate  -Peppermint  -Alcohol (especially red wine)  -Carbonated beverages  -Citrus fruits  -Tomato-based products  -Vinegar  -Spicy and greasy foods    For hemorrhoids:   Increase daily fiber intake to 20-30 grams daily either by dietary intake or an over-the-counter supplement such as Citrucel. Limit alcohol and fatty food intake. Drink at least 80 oz water daily. Exercise regularly and consider weight loss if appropriate based on your current weight. Avoid straining or lingering on the toilet longer than necessary. Review your medication list and discuss with your PCP whether any of these medications may exacerbate constipation. Try warm sitz baths 2-3 times daily for acute hemorrhoidal flare-up. Apply topical or suppository steroid for no longer than 7 days. Try witch hazel pads for itching or burning. Keep the anal area clean and dry; avoid excessive or aggressive wiping. Consider wet wipe usage as needed for hygiene and comfort.

## 2023-08-21 NOTE — ASSESSMENT & PLAN NOTE
Felt to be source of bleeding. Patient with known hx hemorrhoids, multiple prior interventions. Declines surgical referral. Provided written recommendations for basic hemorrhoid care.

## 2023-08-23 ENCOUNTER — HOSPITAL ENCOUNTER (OUTPATIENT)
Dept: LAB | Age: 63
Discharge: HOME | End: 2023-08-26
Payer: MEDICARE

## 2023-08-23 ENCOUNTER — OFFICE VISIT (OUTPATIENT)
Dept: ONCOLOGY | Age: 63
End: 2023-08-23
Payer: MEDICARE

## 2023-08-23 VITALS
BODY MASS INDEX: 30.51 KG/M2 | OXYGEN SATURATION: 94 % | HEART RATE: 80 BPM | TEMPERATURE: 98 F | DIASTOLIC BLOOD PRESSURE: 99 MMHG | RESPIRATION RATE: 16 BRPM | WEIGHT: 201.3 LBS | SYSTOLIC BLOOD PRESSURE: 146 MMHG | HEIGHT: 68 IN

## 2023-08-23 DIAGNOSIS — D47.1 MPN (MYELOPROLIFERATIVE NEOPLASM) (HCC): Primary | ICD-10-CM

## 2023-08-23 DIAGNOSIS — E55.9 VITAMIN D DEFICIENCY: ICD-10-CM

## 2023-08-23 DIAGNOSIS — R79.89 ELEVATED FERRITIN: ICD-10-CM

## 2023-08-23 DIAGNOSIS — D47.1 MPN (MYELOPROLIFERATIVE NEOPLASM) (HCC): ICD-10-CM

## 2023-08-23 DIAGNOSIS — D72.829 LEUKOCYTOSIS, UNSPECIFIED TYPE: ICD-10-CM

## 2023-08-23 DIAGNOSIS — K29.70 GASTRITIS, PRESENCE OF BLEEDING UNSPECIFIED, UNSPECIFIED CHRONICITY, UNSPECIFIED GASTRITIS TYPE: ICD-10-CM

## 2023-08-23 DIAGNOSIS — D75.839 THROMBOCYTOSIS: ICD-10-CM

## 2023-08-23 LAB
25(OH)D3 SERPL-MCNC: 42.4 NG/ML (ref 30–100)
ALBUMIN SERPL-MCNC: 3.6 G/DL (ref 3.2–4.6)
ALBUMIN/GLOB SERPL: 1 (ref 0.4–1.6)
ALP SERPL-CCNC: 89 U/L (ref 50–136)
ALT SERPL-CCNC: 34 U/L (ref 12–65)
ANION GAP SERPL CALC-SCNC: 3 MMOL/L (ref 2–11)
AST SERPL-CCNC: 18 U/L (ref 15–37)
BASOPHILS # BLD: 0.1 K/UL (ref 0–0.2)
BASOPHILS NFR BLD: 1 % (ref 0–2)
BILIRUB SERPL-MCNC: 0.3 MG/DL (ref 0.2–1.1)
BUN SERPL-MCNC: 12 MG/DL (ref 8–23)
CALCIUM SERPL-MCNC: 9 MG/DL (ref 8.3–10.4)
CHLORIDE SERPL-SCNC: 107 MMOL/L (ref 101–110)
CO2 SERPL-SCNC: 28 MMOL/L (ref 21–32)
CREAT SERPL-MCNC: 1 MG/DL (ref 0.8–1.5)
CRP SERPL-MCNC: 0.5 MG/DL (ref 0–0.9)
DAT POLY-SP REAG RBC QL: NORMAL
DIFFERENTIAL METHOD BLD: ABNORMAL
EOSINOPHIL # BLD: 0.4 K/UL (ref 0–0.8)
EOSINOPHIL NFR BLD: 4 % (ref 0.5–7.8)
ERYTHROCYTE [DISTWIDTH] IN BLOOD BY AUTOMATED COUNT: 16.6 % (ref 11.9–14.6)
ERYTHROCYTE [SEDIMENTATION RATE] IN BLOOD: 8 MM/HR (ref 0–20)
FERRITIN SERPL-MCNC: 385 NG/ML (ref 8–388)
GLOBULIN SER CALC-MCNC: 3.7 G/DL (ref 2.8–4.5)
GLUCOSE SERPL-MCNC: 97 MG/DL (ref 65–100)
HAPTOGLOB SERPL-MCNC: 88 MG/DL (ref 30–200)
HCT VFR BLD AUTO: 40.5 % (ref 41.1–50.3)
HGB BLD-MCNC: 12.9 G/DL (ref 13.6–17.2)
HGB RETIC QN AUTO: 36 PG (ref 29–35)
IMM GRANULOCYTES # BLD AUTO: 0 K/UL (ref 0–0.5)
IMM GRANULOCYTES NFR BLD AUTO: 0 % (ref 0–5)
IMM RETICS NFR: 12.3 % (ref 2.3–13.4)
IRON SATN MFR SERPL: 22 %
IRON SERPL-MCNC: 60 UG/DL (ref 35–150)
LYMPHOCYTES # BLD: 5.4 K/UL (ref 0.5–4.6)
LYMPHOCYTES NFR BLD: 57 % (ref 13–44)
Lab: NORMAL
MCH RBC QN AUTO: 28.1 PG (ref 26.1–32.9)
MCHC RBC AUTO-ENTMCNC: 31.9 G/DL (ref 31.4–35)
MCV RBC AUTO: 88.2 FL (ref 82–102)
MONOCYTES # BLD: 0.6 K/UL (ref 0.1–1.3)
MONOCYTES NFR BLD: 6 % (ref 4–12)
NEUTS SEG # BLD: 3 K/UL (ref 1.7–8.2)
NEUTS SEG NFR BLD: 32 % (ref 43–78)
NRBC # BLD: 0 K/UL (ref 0–0.2)
PLATELET # BLD AUTO: 285 K/UL (ref 150–450)
PMV BLD AUTO: 10 FL (ref 9.4–12.3)
POTASSIUM SERPL-SCNC: 3.9 MMOL/L (ref 3.5–5.1)
PROT SERPL-MCNC: 7.3 G/DL (ref 6.3–8.2)
RBC # BLD AUTO: 4.59 M/UL (ref 4.23–5.6)
REFERENCE LAB: NORMAL
REFERENCE LAB: NORMAL
RETICS # AUTO: 0.11 M/UL (ref 0.03–0.1)
RETICS/RBC NFR AUTO: 2.3 % (ref 0.3–2)
SODIUM SERPL-SCNC: 138 MMOL/L (ref 133–143)
TIBC SERPL-MCNC: 272 UG/DL (ref 250–450)
VIT B12 SERPL-MCNC: 2144 PG/ML (ref 193–986)
WBC # BLD AUTO: 9.5 K/UL (ref 4.3–11.1)

## 2023-08-23 PROCEDURE — 85025 COMPLETE CBC W/AUTO DIFF WBC: CPT

## 2023-08-23 PROCEDURE — G8417 CALC BMI ABV UP PARAM F/U: HCPCS | Performed by: INTERNAL MEDICINE

## 2023-08-23 PROCEDURE — 82784 ASSAY IGA/IGD/IGG/IGM EACH: CPT

## 2023-08-23 PROCEDURE — 82607 VITAMIN B-12: CPT

## 2023-08-23 PROCEDURE — 83550 IRON BINDING TEST: CPT

## 2023-08-23 PROCEDURE — 83540 ASSAY OF IRON: CPT

## 2023-08-23 PROCEDURE — 1036F TOBACCO NON-USER: CPT | Performed by: INTERNAL MEDICINE

## 2023-08-23 PROCEDURE — 86140 C-REACTIVE PROTEIN: CPT

## 2023-08-23 PROCEDURE — 82728 ASSAY OF FERRITIN: CPT

## 2023-08-23 PROCEDURE — 85046 RETICYTE/HGB CONCENTRATE: CPT

## 2023-08-23 PROCEDURE — 36415 COLL VENOUS BLD VENIPUNCTURE: CPT

## 2023-08-23 PROCEDURE — 80053 COMPREHEN METABOLIC PANEL: CPT

## 2023-08-23 PROCEDURE — 85652 RBC SED RATE AUTOMATED: CPT

## 2023-08-23 PROCEDURE — 84165 PROTEIN E-PHORESIS SERUM: CPT

## 2023-08-23 PROCEDURE — 82306 VITAMIN D 25 HYDROXY: CPT

## 2023-08-23 PROCEDURE — 86334 IMMUNOFIX E-PHORESIS SERUM: CPT

## 2023-08-23 PROCEDURE — 99215 OFFICE O/P EST HI 40 MIN: CPT | Performed by: INTERNAL MEDICINE

## 2023-08-23 PROCEDURE — 86880 COOMBS TEST DIRECT: CPT

## 2023-08-23 PROCEDURE — G8428 CUR MEDS NOT DOCUMENT: HCPCS | Performed by: INTERNAL MEDICINE

## 2023-08-23 PROCEDURE — 83010 ASSAY OF HAPTOGLOBIN QUANT: CPT

## 2023-08-23 PROCEDURE — 3017F COLORECTAL CA SCREEN DOC REV: CPT | Performed by: INTERNAL MEDICINE

## 2023-08-23 ASSESSMENT — PATIENT HEALTH QUESTIONNAIRE - PHQ9
SUM OF ALL RESPONSES TO PHQ QUESTIONS 1-9: 0
2. FEELING DOWN, DEPRESSED OR HOPELESS: 0
SUM OF ALL RESPONSES TO PHQ QUESTIONS 1-9: 0
SUM OF ALL RESPONSES TO PHQ9 QUESTIONS 1 & 2: 0
SUM OF ALL RESPONSES TO PHQ QUESTIONS 1-9: 0
SUM OF ALL RESPONSES TO PHQ QUESTIONS 1-9: 0
1. LITTLE INTEREST OR PLEASURE IN DOING THINGS: 0

## 2023-08-23 NOTE — PROGRESS NOTES
[address] [facility], [address] [place] [address]  Phone: [phone]        8/23/2023  Linda Huynh  1960  [id number]      Linda Huynh is a 61year old  man who has returned to my clinic for a follow-up visit; he was initially referred to me by Dr. Severa Skene for evaluation of Leukocytosis and Thrombocytosis, his inflammatory markers were elevated, he does not have Iron overload or Hereditary Hemochromatosis. ALLERGIES:    No known drug allergies. FAMILY HISTORY:     No hematologic disorders. SOCIAL HISTORY:     He is  and lives with his wife. He used to work as a . He denies ever using any tobacco products. PAST MEDICAL HISTORY:     Anxiety Disorder, Colon Cancer, Hypertension, Gastritis, Hyperlipidemia, Iron Deficiency Anemia, Neuropathy, Osteoarthritis, Restless Leg Syndrome, Still's Disease ( adult-onset ) and Polio. ROS:  The patient complained of fatigue and arthralgia; all other systems negative. PHYSICAL EXAM:   The patient was alert, awake and oriented, no acute distress was noted. Oral examination did not reveal any mucosal lesions. Lymph node examination did not reveal any adenopathy. Neck examination revealed a supple neck, no thyromegaly or masses were noted. Chest examination revealed normal vesicular breath sounds. Heart examination revealed S-1 and S-2 without any murmurs. Abdominal examination revealed a non-tender abdomen, bowel sounds were positive, no organomegaly could be appreciated, a laparotomy scar was present. Examination of the extremities did not reveal any tenderness or erythema. Examination of the skin did not reveal any lesions. KPS:    80. LABORATORY INVESTIGATIONS:  CBC showed a WBC count of 9.5, ANC was 3.1, Hemoglobin was 12.9 and Platelets were 037. Medical problems and test results were reviewed with the patient today.       ASSESSMENT:    MPN; Leukocytosis;

## 2023-08-23 NOTE — PATIENT INSTRUCTIONS
Patient Instructions from Today's Visit    Reason for Visit:  New Patient    Diagnosis Information:  [email or web address]    Plan: You will have some blood work done today  We will check for any underlying bone marrow issues and micronutrient deficiencies. Follow Up: We will bring you back in October for a follow up with Aryan Truong and lab work prior. Recent Lab Results:  Lab work done today downstairs    Treatment Summary has been discussed and given to patient: n/a        -------------------------------------------------------------------------------------------------------------------  Please call our office at [phone] if you have any  of the following symptoms:   Fever of 100.5 or greater  Chills  Shortness of breath  Swelling or pain in one leg    After office hours an answering service is available and will contact a provider for emergencies or if you are experiencing any of the above symptoms. Patient has My Chart. My Chart log in information explained on the after visit summary printout at the [address]  desk.     Lupe Fonseca MA

## 2023-08-25 ENCOUNTER — TELEPHONE (OUTPATIENT)
Dept: GASTROENTEROLOGY | Age: 63
End: 2023-08-25

## 2023-08-28 LAB
ALBUMIN SERPL ELPH-MCNC: 3.9 G/DL (ref 2.9–4.4)
ALBUMIN/GLOB SERPL: 1.4 (ref 0.7–1.7)
ALPHA1 GLOB SERPL ELPH-MCNC: 0.2 G/DL (ref 0–0.4)
ALPHA2 GLOB SERPL ELPH-MCNC: 0.5 G/DL (ref 0.4–1)
B-GLOBULIN SERPL ELPH-MCNC: 0.9 G/DL (ref 0.7–1.3)
GAMMA GLOB SERPL ELPH-MCNC: 1.2 G/DL (ref 0.4–1.8)
GLOBULIN SER-MCNC: 2.8 G/DL (ref 2.2–3.9)
IGA SERPL-MCNC: 108 MG/DL (ref 61–437)
IGG SERPL-MCNC: 1358 MG/DL (ref 603–1613)
IGM SERPL-MCNC: 63 MG/DL (ref 20–172)
INTERPRETATION SERPL IEP-IMP: NORMAL
M PROTEIN SERPL ELPH-MCNC: NORMAL G/DL
PROT SERPL-MCNC: 6.7 G/DL (ref 6–8.5)

## 2023-08-30 LAB
HFE GENE MUT ANL BLD/T: NORMAL
REVIEWED BY: NORMAL

## 2023-10-02 ENCOUNTER — TELEPHONE (OUTPATIENT)
Dept: RHEUMATOLOGY | Age: 63
End: 2023-10-02

## 2023-10-02 ENCOUNTER — OFFICE VISIT (OUTPATIENT)
Dept: RHEUMATOLOGY | Age: 63
End: 2023-10-02
Payer: MEDICARE

## 2023-10-02 VITALS
DIASTOLIC BLOOD PRESSURE: 96 MMHG | WEIGHT: 207 LBS | BODY MASS INDEX: 31.37 KG/M2 | HEIGHT: 68 IN | SYSTOLIC BLOOD PRESSURE: 154 MMHG | HEART RATE: 58 BPM

## 2023-10-02 DIAGNOSIS — M06.1 STILL'S DISEASE OF ADULT (HCC): Primary | ICD-10-CM

## 2023-10-02 DIAGNOSIS — Z23 NEED FOR IMMUNIZATION AGAINST INFLUENZA: ICD-10-CM

## 2023-10-02 DIAGNOSIS — K76.9 LIVER DISEASE, UNSPECIFIED: ICD-10-CM

## 2023-10-02 DIAGNOSIS — M06.1 STILL'S DISEASE OF ADULT (HCC): ICD-10-CM

## 2023-10-02 DIAGNOSIS — Z79.899 LONG-TERM USE OF HIGH-RISK MEDICATION: ICD-10-CM

## 2023-10-02 LAB
ALBUMIN SERPL-MCNC: 3.8 G/DL (ref 3.2–4.6)
ALBUMIN/GLOB SERPL: 1.2 (ref 0.4–1.6)
ALP SERPL-CCNC: 76 U/L (ref 50–136)
ALT SERPL-CCNC: 52 U/L (ref 12–65)
ANION GAP SERPL CALC-SCNC: 4 MMOL/L (ref 2–11)
AST SERPL-CCNC: 22 U/L (ref 15–37)
BASOPHILS # BLD: 0.1 K/UL (ref 0–0.2)
BASOPHILS NFR BLD: 1 % (ref 0–2)
BILIRUB SERPL-MCNC: 0.3 MG/DL (ref 0.2–1.1)
BUN SERPL-MCNC: 19 MG/DL (ref 8–23)
CALCIUM SERPL-MCNC: 9 MG/DL (ref 8.3–10.4)
CHLORIDE SERPL-SCNC: 109 MMOL/L (ref 101–110)
CO2 SERPL-SCNC: 28 MMOL/L (ref 21–32)
CREAT SERPL-MCNC: 1 MG/DL (ref 0.8–1.5)
DIFFERENTIAL METHOD BLD: ABNORMAL
EOSINOPHIL # BLD: 0.4 K/UL (ref 0–0.8)
EOSINOPHIL NFR BLD: 4 % (ref 0.5–7.8)
ERYTHROCYTE [DISTWIDTH] IN BLOOD BY AUTOMATED COUNT: 15.9 % (ref 11.9–14.6)
FERRITIN SERPL-MCNC: 248 NG/ML (ref 8–388)
GLOBULIN SER CALC-MCNC: 3.3 G/DL (ref 2.8–4.5)
GLUCOSE SERPL-MCNC: 94 MG/DL (ref 65–100)
HCT VFR BLD AUTO: 43.1 % (ref 41.1–50.3)
HGB BLD-MCNC: 14 G/DL (ref 13.6–17.2)
IMM GRANULOCYTES # BLD AUTO: 0 K/UL (ref 0–0.5)
IMM GRANULOCYTES NFR BLD AUTO: 0 % (ref 0–5)
LYMPHOCYTES # BLD: 4.7 K/UL (ref 0.5–4.6)
LYMPHOCYTES NFR BLD: 52 % (ref 13–44)
MCH RBC QN AUTO: 29.5 PG (ref 26.1–32.9)
MCHC RBC AUTO-ENTMCNC: 32.5 G/DL (ref 31.4–35)
MCV RBC AUTO: 90.7 FL (ref 82–102)
MONOCYTES # BLD: 0.6 K/UL (ref 0.1–1.3)
MONOCYTES NFR BLD: 7 % (ref 4–12)
NEUTS SEG # BLD: 3.2 K/UL (ref 1.7–8.2)
NEUTS SEG NFR BLD: 36 % (ref 43–78)
NRBC # BLD: 0 K/UL (ref 0–0.2)
PLATELET # BLD AUTO: 245 K/UL (ref 150–450)
PMV BLD AUTO: 10.7 FL (ref 9.4–12.3)
POTASSIUM SERPL-SCNC: 4.2 MMOL/L (ref 3.5–5.1)
PROT SERPL-MCNC: 7.1 G/DL (ref 6.3–8.2)
RBC # BLD AUTO: 4.75 M/UL (ref 4.23–5.6)
SODIUM SERPL-SCNC: 141 MMOL/L (ref 133–143)
WBC # BLD AUTO: 9 K/UL (ref 4.3–11.1)

## 2023-10-02 PROCEDURE — G8482 FLU IMMUNIZE ORDER/ADMIN: HCPCS | Performed by: INTERNAL MEDICINE

## 2023-10-02 PROCEDURE — G0008 ADMIN INFLUENZA VIRUS VAC: HCPCS | Performed by: INTERNAL MEDICINE

## 2023-10-02 PROCEDURE — 90674 CCIIV4 VAC NO PRSV 0.5 ML IM: CPT | Performed by: INTERNAL MEDICINE

## 2023-10-02 PROCEDURE — 99215 OFFICE O/P EST HI 40 MIN: CPT | Performed by: INTERNAL MEDICINE

## 2023-10-02 PROCEDURE — 1036F TOBACCO NON-USER: CPT | Performed by: INTERNAL MEDICINE

## 2023-10-02 PROCEDURE — G8427 DOCREV CUR MEDS BY ELIG CLIN: HCPCS | Performed by: INTERNAL MEDICINE

## 2023-10-02 PROCEDURE — G8417 CALC BMI ABV UP PARAM F/U: HCPCS | Performed by: INTERNAL MEDICINE

## 2023-10-02 PROCEDURE — 3017F COLORECTAL CA SCREEN DOC REV: CPT | Performed by: INTERNAL MEDICINE

## 2023-10-02 ASSESSMENT — ROUTINE ASSESSMENT OF PATIENT INDEX DATA (RAPID3)
ON A SCALE OF ONE TO TEN, HOW MUCH PAIN HAVE YOU HAD BECAUSE OF YOUR CONDITION OVER THE PAST WEEK?: 4
WHEN YOU AWAKENED IN THE MORNING OVER THE LAST WEEK, PLEASE INDICATE THE AMOUNT OF TIME IT TAKES UNTIL YOU ARE AS LIMBER AS YOU WILL BE FOR THE DAY: 15 MIN
ON A SCALE OF ONE TO TEN, HOW DIFFICULT WAS IT FOR YOU TO COMPLETE THE LISTED DAILY PHYSICAL TASKS OVER THE LAST WEEK: 0.7
ON A SCALE OF ONE TO TEN, HOW MUCH OF A PROBLEM HAS UNUSUAL FATIGUE OR TIREDNESS BEEN FOR YOU OVER THE PAST WEEK?: 4
ON A SCALE OF ONE TO TEN, CONSIDERING ALL THE WAYS IN WHICH ILLNESS AND HEALTH CONDITIONS MAY AFFECT YOU AT THIS TIME, PLEASE INDICATE BELOW HOW YOU ARE DOING:: 4

## 2023-10-02 ASSESSMENT — JOINT PAIN
TOTAL NUMBER OF TENDER JOINTS: 2
TOTAL NUMBER OF SWOLLEN JOINTS: 0

## 2023-10-02 NOTE — TELEPHONE ENCOUNTER
START from for Ilaris filled out and printed. Will need pt sig and dr sig. Patient insured with [employer] and [employer]. Dosing= 4 mg/kg every 4 weeks. Weight is 207 lbs or 94 kg. Max dosing is 300 mg. Each vial is 150 mg/ml. Injections are given in the office, or by home health if more convenient. (Pt lives in [facility]).

## 2023-10-02 NOTE — TELEPHONE ENCOUNTER
----- Message from April CourseALLEY contreras sent at 10/2/2023  9:58 AM EDT -----  MD China Candelario Courser, MA    I will tell my staff to run a PA on either Ilaris or Anakinra [either 1] both of which happen to be IL-1 blockers, secondary to the onset of the rash that he has had secondary to his disease.

## 2023-10-03 ENCOUNTER — TELEPHONE (OUTPATIENT)
Dept: RHEUMATOLOGY | Age: 63
End: 2023-10-03

## 2023-10-03 NOTE — TELEPHONE ENCOUNTER
----- Message from Mihir Coon MA sent at 10/2/2023  9:58 AM EDT -----  MD Mihir Willis MA    I will tell my staff to run a PA on either Ilaris or Anakinra [either 1] both of which happen to be IL-1 blockers, secondary to the onset of the rash that he has had secondary to his disease.

## 2023-10-04 RX ORDER — ANAKINRA 100 MG/.67ML
100 INJECTION, SOLUTION SUBCUTANEOUS DAILY
Qty: 30 EACH | Refills: 3 | Status: SHIPPED | OUTPATIENT
Start: 2023-10-04 | End: 2023-10-18 | Stop reason: SDUPTHER

## 2023-10-04 NOTE — TELEPHONE ENCOUNTER
PHONE CALL to patient to see if he has a preference of which medication, either Kineret or Ilaris. He felt  was leaning toward the Kineret, so he said that would be his first choice. PA started online on CMM, sent to [employer] with clinicals. Approved today  XAWUQO:[id number];OGTCAK:CLAUDIA; Review Type:Prior Auth; Coverage Start Date:09/04/2023; Coverage End Date:10/03/2024;;CaseId:[id number];Status:Denied; Review Type:Prior Auth; Appeal Information:;    Rx for Kineret entered and pended for review and sig to go to [facility]. RA dosing in PI is 100 mg//0.67 ml once daily. Please addend as appropriate. PHONE CALL from patient - he got a call from [facility]. He is showing that [facility] can fill his Rx. Changed the rx to go to [facility]. He will juan carlos once he receives the medication to set up injection training.

## 2023-10-05 DIAGNOSIS — M06.1 ADULT-ONSET STILL'S DISEASE (HCC): ICD-10-CM

## 2023-10-05 DIAGNOSIS — F51.04 PSYCHOPHYSIOLOGICAL INSOMNIA: ICD-10-CM

## 2023-10-06 RX ORDER — AMITRIPTYLINE HYDROCHLORIDE 25 MG/1
25 TABLET, FILM COATED ORAL NIGHTLY
Qty: 30 TABLET | Refills: 0 | Status: SHIPPED | OUTPATIENT
Start: 2023-10-06

## 2023-10-09 NOTE — TELEPHONE ENCOUNTER
PHONE CALL to patient to see if he has received the Kineret and schedule the injection training. He said Mark Gusman is waiting to get it in stock. Once her gets it, he will call me to schedule the training.

## 2023-10-10 DIAGNOSIS — M06.1 STILL'S DISEASE OF ADULT (HCC): ICD-10-CM

## 2023-10-10 DIAGNOSIS — D47.1 MPN (MYELOPROLIFERATIVE NEOPLASM) (HCC): Primary | ICD-10-CM

## 2023-10-10 DIAGNOSIS — D72.829 LEUKOCYTOSIS, UNSPECIFIED TYPE: ICD-10-CM

## 2023-10-11 ENCOUNTER — OFFICE VISIT (OUTPATIENT)
Dept: ONCOLOGY | Age: 63
End: 2023-10-11
Payer: MEDICARE

## 2023-10-11 ENCOUNTER — HOSPITAL ENCOUNTER (OUTPATIENT)
Dept: LAB | Age: 63
Discharge: HOME | End: 2023-10-14
Payer: MEDICARE

## 2023-10-11 VITALS
HEIGHT: 68 IN | OXYGEN SATURATION: 96 % | WEIGHT: 204.8 LBS | HEART RATE: 65 BPM | DIASTOLIC BLOOD PRESSURE: 94 MMHG | BODY MASS INDEX: 31.04 KG/M2 | TEMPERATURE: 98.2 F | RESPIRATION RATE: 12 BRPM | SYSTOLIC BLOOD PRESSURE: 158 MMHG

## 2023-10-11 DIAGNOSIS — D72.829 LEUKOCYTOSIS, UNSPECIFIED TYPE: ICD-10-CM

## 2023-10-11 DIAGNOSIS — D47.1 MPN (MYELOPROLIFERATIVE NEOPLASM) (HCC): ICD-10-CM

## 2023-10-11 DIAGNOSIS — D75.839 THROMBOCYTOSIS: ICD-10-CM

## 2023-10-11 DIAGNOSIS — I10 HYPERTENSION, UNSPECIFIED TYPE: ICD-10-CM

## 2023-10-11 DIAGNOSIS — D72.829 LEUKOCYTOSIS, UNSPECIFIED TYPE: Primary | ICD-10-CM

## 2023-10-11 DIAGNOSIS — K29.70 GASTRITIS, PRESENCE OF BLEEDING UNSPECIFIED, UNSPECIFIED CHRONICITY, UNSPECIFIED GASTRITIS TYPE: ICD-10-CM

## 2023-10-11 LAB
ALBUMIN SERPL-MCNC: 3.9 G/DL (ref 3.2–4.6)
ALBUMIN/GLOB SERPL: 1.1 (ref 0.4–1.6)
ALP SERPL-CCNC: 76 U/L (ref 50–136)
ALT SERPL-CCNC: 57 U/L (ref 12–65)
ANION GAP SERPL CALC-SCNC: 4 MMOL/L (ref 2–11)
AST SERPL-CCNC: 27 U/L (ref 15–37)
BASOPHILS # BLD: 0.1 K/UL (ref 0–0.2)
BASOPHILS NFR BLD: 1 % (ref 0–2)
BILIRUB SERPL-MCNC: 0.5 MG/DL (ref 0.2–1.1)
BUN SERPL-MCNC: 15 MG/DL (ref 8–23)
CALCIUM SERPL-MCNC: 9.2 MG/DL (ref 8.3–10.4)
CHLORIDE SERPL-SCNC: 108 MMOL/L (ref 101–110)
CO2 SERPL-SCNC: 29 MMOL/L (ref 21–32)
CREAT SERPL-MCNC: 1.1 MG/DL (ref 0.8–1.5)
DIFFERENTIAL METHOD BLD: ABNORMAL
EOSINOPHIL # BLD: 0.3 K/UL (ref 0–0.8)
EOSINOPHIL NFR BLD: 3 % (ref 0.5–7.8)
ERYTHROCYTE [DISTWIDTH] IN BLOOD BY AUTOMATED COUNT: 15.7 % (ref 11.9–14.6)
GLOBULIN SER CALC-MCNC: 3.7 G/DL (ref 2.8–4.5)
GLUCOSE SERPL-MCNC: 126 MG/DL (ref 65–100)
HCT VFR BLD AUTO: 44.6 % (ref 41.1–50.3)
HGB BLD-MCNC: 15 G/DL (ref 13.6–17.2)
IMM GRANULOCYTES # BLD AUTO: 0 K/UL (ref 0–0.5)
IMM GRANULOCYTES NFR BLD AUTO: 0 % (ref 0–5)
LYMPHOCYTES # BLD: 4 K/UL (ref 0.5–4.6)
LYMPHOCYTES NFR BLD: 46 % (ref 13–44)
MCH RBC QN AUTO: 29.6 PG (ref 26.1–32.9)
MCHC RBC AUTO-ENTMCNC: 33.6 G/DL (ref 31.4–35)
MCV RBC AUTO: 88 FL (ref 82–102)
MONOCYTES # BLD: 0.5 K/UL (ref 0.1–1.3)
MONOCYTES NFR BLD: 6 % (ref 4–12)
NEUTS SEG # BLD: 3.8 K/UL (ref 1.7–8.2)
NEUTS SEG NFR BLD: 44 % (ref 43–78)
NRBC # BLD: 0 K/UL (ref 0–0.2)
PLATELET # BLD AUTO: 240 K/UL (ref 150–450)
PMV BLD AUTO: 10.2 FL (ref 9.4–12.3)
POTASSIUM SERPL-SCNC: 3.7 MMOL/L (ref 3.5–5.1)
PROT SERPL-MCNC: 7.6 G/DL (ref 6.3–8.2)
RBC # BLD AUTO: 5.07 M/UL (ref 4.23–5.6)
SODIUM SERPL-SCNC: 141 MMOL/L (ref 133–143)
WBC # BLD AUTO: 8.7 K/UL (ref 4.3–11.1)

## 2023-10-11 PROCEDURE — G8417 CALC BMI ABV UP PARAM F/U: HCPCS | Performed by: INTERNAL MEDICINE

## 2023-10-11 PROCEDURE — G8482 FLU IMMUNIZE ORDER/ADMIN: HCPCS | Performed by: INTERNAL MEDICINE

## 2023-10-11 PROCEDURE — 36415 COLL VENOUS BLD VENIPUNCTURE: CPT

## 2023-10-11 PROCEDURE — 1036F TOBACCO NON-USER: CPT | Performed by: INTERNAL MEDICINE

## 2023-10-11 PROCEDURE — 3080F DIAST BP >= 90 MM HG: CPT | Performed by: INTERNAL MEDICINE

## 2023-10-11 PROCEDURE — 3017F COLORECTAL CA SCREEN DOC REV: CPT | Performed by: INTERNAL MEDICINE

## 2023-10-11 PROCEDURE — 3077F SYST BP >= 140 MM HG: CPT | Performed by: INTERNAL MEDICINE

## 2023-10-11 PROCEDURE — 99215 OFFICE O/P EST HI 40 MIN: CPT | Performed by: INTERNAL MEDICINE

## 2023-10-11 PROCEDURE — 80053 COMPREHEN METABOLIC PANEL: CPT

## 2023-10-11 PROCEDURE — 85025 COMPLETE CBC W/AUTO DIFF WBC: CPT

## 2023-10-11 PROCEDURE — G8427 DOCREV CUR MEDS BY ELIG CLIN: HCPCS | Performed by: INTERNAL MEDICINE

## 2023-10-11 ASSESSMENT — PATIENT HEALTH QUESTIONNAIRE - PHQ9
SUM OF ALL RESPONSES TO PHQ9 QUESTIONS 1 & 2: 0
1. LITTLE INTEREST OR PLEASURE IN DOING THINGS: 0
SUM OF ALL RESPONSES TO PHQ QUESTIONS 1-9: 0
2. FEELING DOWN, DEPRESSED OR HOPELESS: 0

## 2023-10-11 NOTE — PROGRESS NOTES
[address] [facility], [facility]  Phone: [phone]           10/11/2023  Juliano Womack  1960  [id number]        Juliano Womack is a 61year old  man who has returned to my clinic for a follow-up visit; he was initially referred to me by Dr. Orlando Chow for evaluation of Leukocytosis and Thrombocytosis, his inflammatory markers were elevated, he does not have Iron overload or Hereditary Hemochromatosis. ALLERGIES:    No known drug allergies. FAMILY HISTORY:     No hematologic disorders. SOCIAL HISTORY:     He is  and lives with his wife. He used to work as a . He denies ever using any tobacco products. PAST MEDICAL HISTORY:     Anxiety Disorder, Colon Cancer, Hypertension, Gastritis, Hyperlipidemia, Iron Deficiency Anemia, Neuropathy, Osteoarthritis, Restless Leg Syndrome, Still's Disease ( adult-onset ) and Polio. ROS:  The patient complained of fatigue and arthralgia; all other systems negative. PHYSICAL EXAM:   The patient was alert, awake and oriented, no acute distress was noted. Oral examination did not reveal any mucosal lesions. Lymph node examination did not reveal any adenopathy. Neck examination revealed a supple neck, no thyromegaly or masses were noted. Chest examination revealed normal vesicular breath sounds. Heart examination revealed S-1 and S-2 without any murmurs. Abdominal examination revealed a non-tender abdomen, bowel sounds were positive, no organomegaly could be appreciated, a laparotomy scar was present. Examination of the extremities did not reveal any tenderness or erythema. Examination of the skin did not reveal any lesions. KPS:    80. LABORATORY INVESTIGATIONS:  CBC showed a WBC count of 8.7, ANC was 3.8, Hemoglobin was 15.1 and Platelets were 278. Medical problems and test results were reviewed with the patient today.         ASSESSMENT:

## 2023-10-11 NOTE — PATIENT INSTRUCTIONS
Patient Instructions from Today's Visit    Reason for Visit:  Follow Up    Diagnosis Information:  [email or web address]-[email or web address]      Plan:  Lab work looks stable today  We did not find any evidence of a bone marrow cancer  It looks like you had inflammation in your body that threw you blood counts off    -counts are all returning to normal  We will see you at the end of this year to follow up and do more blood work. If your lab work is stable at that time, we will discharge you from our practice. Follow Up:   We will bring you back in December for a follow up with Jamal Rowland and lab work prior    Recent Lab Results:  Hospital Outpatient Visit on 10/11/2023   Component Date Value Ref Range Status    WBC 10/11/2023 8.7  4.3 - 11.1 K/uL Final    RBC 10/11/2023 5.07  4.23 - 5.6 M/uL Final    Hemoglobin 10/11/2023 15.0  13.6 - 17.2 g/dL Final    Hematocrit 10/11/2023 44.6  41.1 - 50.3 % Final    MCV 10/11/2023 88.0  82.0 - 102.0 FL Final    MCH 10/11/2023 29.6  26.1 - 32.9 PG Final    MCHC 10/11/2023 33.6  31.4 - 35.0 g/dL Final    RDW 10/11/2023 15.7 (H)  11.9 - 14.6 % Final    Platelets 31/86/6407 240  150 - 450 K/uL Final    MPV 10/11/2023 10.2  9.4 - 12.3 FL Final    nRBC 10/11/2023 0.00  0.0 - 0.2 K/uL Final    **Note: Absolute NRBC parameter is now reported with Hemogram**    Differential Type 10/11/2023 AUTOMATED    Final    Neutrophils % 10/11/2023 44  43 - 78 % Final    Lymphocytes % 10/11/2023 46 (H)  13 - 44 % Final    Monocytes % 10/11/2023 6  4.0 - 12.0 % Final    Eosinophils % 10/11/2023 3  0.5 - 7.8 % Final    Basophils % 10/11/2023 1  0.0 - 2.0 % Final    Immature Granulocytes 10/11/2023 0  0.0 - 5.0 % Final    Neutrophils Absolute 10/11/2023 3.8  1.7 - 8.2 K/UL Final    Lymphocytes Absolute 10/11/2023 4.0  0.5 - 4.6 K/UL Final    Monocytes Absolute 10/11/2023 0.5  0.1 - 1.3 K/UL Final    Eosinophils Absolute 10/11/2023 0.3  0.0 - 0.8

## 2023-10-18 LAB
FLOW CYTOMETRY RESULTS: NORMAL
SPECIMEN SOURCE: NORMAL
TEST ORDERED: NORMAL

## 2023-10-18 RX ORDER — ANAKINRA 100 MG/.67ML
100 INJECTION, SOLUTION SUBCUTANEOUS DAILY
Qty: 30 EACH | Refills: 3 | Status: SHIPPED | OUTPATIENT
Start: 2023-10-18

## 2023-10-18 NOTE — TELEPHONE ENCOUNTER
PHONE CALL from Foster at [facility]. Rx needs to be filled with [facility]. He has [employer]. Would go to [facility]. Rx pended for review and sig.

## 2023-10-19 NOTE — TELEPHONE ENCOUNTER
Fax received from [address] [place] [address]. States they cannot fill the Kineret and have to get thru [facility]. Phone # [phone]. Send in Enrollment form, PA, ins cards. They will get copay amount. If he cannot afford, would go thru the [facility]. Can do verbal HIPAA consent if we cannot get patient sig. Alt fax # (fax # on form not working)   [phone]    Printed enrollment form.

## 2023-10-20 NOTE — TELEPHONE ENCOUNTER
PHONE CALL to patient to discuss the Kineret. Asked that he call [employer] at [phone] to provide verbal auth for the Enrollment form so they could verify his coverage. He said he would call today. Merlin Bunnell faxing the enrollment form for me today.

## 2023-10-25 RX ORDER — METHOTREXATE 2.5 MG/1
TABLET ORAL
Qty: 24 TABLET | Refills: 0 | OUTPATIENT
Start: 2023-10-25

## 2023-10-30 DIAGNOSIS — M06.1 STILL'S DISEASE OF ADULT (HCC): ICD-10-CM

## 2023-10-30 DIAGNOSIS — M06.1 ADULT-ONSET STILL'S DISEASE (HCC): ICD-10-CM

## 2023-10-30 RX ORDER — FOLIC ACID 1 MG/1
1 TABLET ORAL DAILY
Qty: 30 TABLET | Refills: 2 | Status: SHIPPED | OUTPATIENT
Start: 2023-10-30

## 2023-10-30 NOTE — TELEPHONE ENCOUNTER
-------------------------------------------------------------------------l------a----s-----t----- ----s-----c-------r-------I-----p--t--------------------------------------------------- -----w--------r-------I----t----t------e-----n-------------------------------------------------------------------------------------------------------------------------------------------------------------------------------------------------------------------------------------------

## 2023-10-30 NOTE — TELEPHONE ENCOUNTER
Pt sent my chart refill request lsd 8/23 next 1/24 labs below , rx pend  will have labs repeated 1/24 @ visit.     Component      Latest Ref Rng 10/2/2023   WBC      4.3 - 11.1 K/uL 9.0    RBC      4.23 - 5.6 M/uL 4.75    Hemoglobin Quant      13.6 - 17.2 g/dL 14.0    Hematocrit      41.1 - 50.3 % 43.1    MCV      82 - 102 FL 90.7    MCH      26.1 - 32.9 PG 29.5    MCHC      31.4 - 35.0 g/dL 32.5    RDW      11.9 - 14.6 % 15.9 (H)    Platelet Count      221 - 450 K/uL 245    MPV      9.4 - 12.3 FL 10.7    Nucleated Red Blood Cells      0.0 - 0.2 K/uL 0.00    Differential Type        AUTOMATED    Neutrophils %      43 - 78 % 36 (L)    Lymphocyte %      13 - 44 % 52 (H)    Monocytes %      4.0 - 12.0 % 7    Eosinophils %      0.5 - 7.8 % 4    Basophils %      0.0 - 2.0 % 1    Immature Granulocytes      0.0 - 5.0 % 0    Neutrophils Absolute      1.7 - 8.2 K/UL 3.2    Lymphocytes Absolute      0.5 - 4.6 K/UL 4.7 (H)    Monocytes Absolute      0.1 - 1.3 K/UL 0.6    Eosinophils Absolute      0.0 - 0.8 K/UL 0.4    Basophils Absolute      0.0 - 0.2 K/UL 0.1    Absolute Immature Granulocyte      0.0 - 0.5 K/UL 0.0    Sodium      133 - 143 mmol/L 141    Potassium      3.5 - 5.1 mmol/L 4.2    Chloride      101 - 110 mmol/L 109    CO2      21 - 32 mmol/L 28    Anion Gap      2 - 11 mmol/L 4    Glucose, Random      65 - 100 mg/dL 94    BUN,BUNPL      8 - 23 MG/DL 19    Creatinine      0.8 - 1.5 MG/DL 1.00    Est, Glom Filt Rate      >60 ml/min/1.73m2 >60    CALCIUM, SERUM, [id number]      8.3 - 10.4 MG/DL 9.0    BILIRUBIN TOTAL      0.2 - 1.1 MG/DL 0.3    ALT      12 - 65 U/L 52    AST      15 - 37 U/L 22    Alk Phos      50 - 136 U/L 76    Total Protein      6.3 - 8.2 g/dL 7.1    Albumin      3.2 - 4.6 g/dL 3.8    Globulin      2.8 - 4.5 g/dL 3.3    ALBUMIN/GLOBULIN RATIO      0.4 - 1.6   1.2       Component      Latest Ref [facility] 8/23/2023   CRP      0.0 - 0.9 mg/dL 0.5

## 2023-11-07 ENCOUNTER — OFFICE VISIT (OUTPATIENT)
Dept: INTERNAL MEDICINE CLINIC | Facility: CLINIC | Age: 63
End: 2023-11-07
Payer: MEDICARE

## 2023-11-07 VITALS
OXYGEN SATURATION: 93 % | WEIGHT: 214 LBS | SYSTOLIC BLOOD PRESSURE: 137 MMHG | DIASTOLIC BLOOD PRESSURE: 84 MMHG | HEART RATE: 63 BPM | TEMPERATURE: 98.4 F | HEIGHT: 68 IN | BODY MASS INDEX: 32.43 KG/M2

## 2023-11-07 DIAGNOSIS — K44.9 HIATAL HERNIA: Primary | ICD-10-CM

## 2023-11-07 DIAGNOSIS — F51.04 PSYCHOPHYSIOLOGICAL INSOMNIA: ICD-10-CM

## 2023-11-07 DIAGNOSIS — M05.9 RHEUMATOID ARTHRITIS WITH RHEUMATOID FACTOR, UNSPECIFIED (HCC): ICD-10-CM

## 2023-11-07 DIAGNOSIS — K29.60 EROSIVE GASTRITIS: ICD-10-CM

## 2023-11-07 DIAGNOSIS — M06.9 RHEUMATOID ARTHRITIS INVOLVING MULTIPLE SITES, UNSPECIFIED WHETHER RHEUMATOID FACTOR PRESENT (HCC): ICD-10-CM

## 2023-11-07 DIAGNOSIS — F41.1 GENERALIZED ANXIETY DISORDER: ICD-10-CM

## 2023-11-07 PROCEDURE — G8417 CALC BMI ABV UP PARAM F/U: HCPCS | Performed by: INTERNAL MEDICINE

## 2023-11-07 PROCEDURE — 1036F TOBACCO NON-USER: CPT | Performed by: INTERNAL MEDICINE

## 2023-11-07 PROCEDURE — G8428 CUR MEDS NOT DOCUMENT: HCPCS | Performed by: INTERNAL MEDICINE

## 2023-11-07 PROCEDURE — 3017F COLORECTAL CA SCREEN DOC REV: CPT | Performed by: INTERNAL MEDICINE

## 2023-11-07 PROCEDURE — 99214 OFFICE O/P EST MOD 30 MIN: CPT | Performed by: INTERNAL MEDICINE

## 2023-11-07 PROCEDURE — G8482 FLU IMMUNIZE ORDER/ADMIN: HCPCS | Performed by: INTERNAL MEDICINE

## 2023-11-07 RX ORDER — AMITRIPTYLINE HYDROCHLORIDE 25 MG/1
25 TABLET, FILM COATED ORAL NIGHTLY
Qty: 90 TABLET | Refills: 1 | Status: SHIPPED | OUTPATIENT
Start: 2023-11-07

## 2023-11-07 RX ORDER — DICLOFENAC SODIUM 75 MG/1
75 TABLET, DELAYED RELEASE ORAL 2 TIMES DAILY PRN
Qty: 60 TABLET | Refills: 0 | Status: SHIPPED | OUTPATIENT
Start: 2023-11-07

## 2023-11-07 RX ORDER — METOPROLOL TARTRATE 50 MG/1
50 TABLET, FILM COATED ORAL 2 TIMES DAILY
Qty: 180 TABLET | Refills: 1 | Status: SHIPPED | OUTPATIENT
Start: 2023-11-07

## 2023-11-07 RX ORDER — PANTOPRAZOLE SODIUM 40 MG/1
40 TABLET, DELAYED RELEASE ORAL
Qty: 90 TABLET | Refills: 1 | Status: SHIPPED | OUTPATIENT
Start: 2023-11-07

## 2023-11-07 ASSESSMENT — PATIENT HEALTH QUESTIONNAIRE - PHQ9
1. LITTLE INTEREST OR PLEASURE IN DOING THINGS: 0
SUM OF ALL RESPONSES TO PHQ QUESTIONS 1-9: 0
SUM OF ALL RESPONSES TO PHQ9 QUESTIONS 1 & 2: 0
2. FEELING DOWN, DEPRESSED OR HOPELESS: 0

## 2023-11-07 NOTE — ACP (ADVANCE CARE PLANNING)
Advance Care Planning   The patient has the following advanced directives on file:  Advance Directives       Power of [address] Will ACP-Advance Directive ACP-Power of     Not on File Not on File Not on File Not on File            The patient has appointed the following active healthcare agents:    Primary Decision Maker: Ronel Hargrove - [phone]    The Patient has the following current code status:    Code Status: Prior    Visit Documentation:  I discussed Advance Care Planning with Ramila Medina today which included the importance of making their choices for care and treatment in the case of a health event that adversely affects their decision-making abilities. He has not completed the Advance Care Directives. He does not have an active health care agent at this time. Ramila Medina was encouraged to complete the declaration forms and provide a signed copy of his medical records.          Charo Meza  11/7/2023

## 2023-11-14 DIAGNOSIS — R97.20 ELEVATED PSA: ICD-10-CM

## 2023-11-14 LAB — PSA SERPL-MCNC: 3 NG/ML

## 2023-11-21 ENCOUNTER — NURSE ONLY (OUTPATIENT)
Dept: RHEUMATOLOGY | Age: 63
End: 2023-11-21

## 2023-11-21 ENCOUNTER — OFFICE VISIT (OUTPATIENT)
Dept: UROLOGY | Age: 63
End: 2023-11-21
Payer: MEDICARE

## 2023-11-21 VITALS — TEMPERATURE: 98 F | SYSTOLIC BLOOD PRESSURE: 137 MMHG | HEART RATE: 56 BPM | DIASTOLIC BLOOD PRESSURE: 80 MMHG

## 2023-11-21 DIAGNOSIS — R97.20 ELEVATED PSA: Primary | ICD-10-CM

## 2023-11-21 DIAGNOSIS — M06.1 STILL'S DISEASE OF ADULT (HCC): Primary | ICD-10-CM

## 2023-11-21 PROCEDURE — 3017F COLORECTAL CA SCREEN DOC REV: CPT | Performed by: NURSE PRACTITIONER

## 2023-11-21 PROCEDURE — G8427 DOCREV CUR MEDS BY ELIG CLIN: HCPCS | Performed by: NURSE PRACTITIONER

## 2023-11-21 PROCEDURE — G8417 CALC BMI ABV UP PARAM F/U: HCPCS | Performed by: NURSE PRACTITIONER

## 2023-11-21 PROCEDURE — G8482 FLU IMMUNIZE ORDER/ADMIN: HCPCS | Performed by: NURSE PRACTITIONER

## 2023-11-21 PROCEDURE — 99213 OFFICE O/P EST LOW 20 MIN: CPT | Performed by: NURSE PRACTITIONER

## 2023-11-21 PROCEDURE — 1036F TOBACCO NON-USER: CPT | Performed by: NURSE PRACTITIONER

## 2023-11-21 ASSESSMENT — ENCOUNTER SYMPTOMS: BACK PAIN: 0

## 2023-11-21 NOTE — PROGRESS NOTES
Arrived to infusion department for  Kineret injection training. Provided education on Kineret and instructed patient on appropriate site sites for self administration. Observed patient self inject Kineret 100 mg/0.67 ml into left lower abdomen subQ. Patient instructed to stay for observation 30 minutes post injection and encouraged to report any side affects. Patient tolerated medication without complication and no adverse reaction present post 30 minute observation. Any issues or concerns during appointment: NO.  Patient aware of next infusion due on 11/22/23 and is to be self administered daily. Patient next appointment with Dr. Faviola Sam is on 1/5/24 at 0800. Discharged ambulatory.

## 2023-11-21 NOTE — PROGRESS NOTES
16 g 3    Multiple Vitamins-Minerals (THERAPEUTIC MULTIVITAMIN-MINERALS) tablet Take 1 tablet by mouth daily      calcium carbonate (TUMS) 500 MG chewable tablet Take 1 tablet by mouth daily as needed       No current facility-administered medications for this visit. Allergies   Allergen Reactions    Naloxone     Neurontin [Gabapentin] Anxiety     Social History     Socioeconomic History    Marital status:      Spouse name: Not on file    Number of children: Not on file    Years of education: Not on file    Highest education level: Not on file   Occupational History    Not on file   Tobacco Use    Smoking status: Never    Smokeless tobacco: Never   Vaping Use    Vaping Use: Never used   Substance and Sexual Activity    Alcohol use: Not Currently    Drug use: Yes     Types: Prescription    Sexual activity: Not Currently     Partners: Female   Other Topics Concern    Not on file   Social History Narrative    Not on file     Social Determinants of Health     Financial Resource Strain: Low Risk  (8/1/2023)    Overall Financial Resource Strain (CARDIA)     Difficulty of Paying Living Expenses: Not hard at all   Food Insecurity: No Food Insecurity (8/1/2023)    Hunger Vital Sign     Worried About Running Out of Food in the Last Year: Never true     [address] in the Last Year: Never true   Transportation Needs: Unknown (8/1/2023)    PRAPARE - Transportation     Lack of Transportation (Medical): Not on file     Lack of Transportation (Non-Medical):  No   Physical Activity: Unknown (7/28/2023)    Exercise Vital Sign     Days of Exercise per Week: Patient refused     Minutes of Exercise per Session: Not on file   Stress: Not on file   Social Connections: Not on file   Intimate Partner Violence: Not on file   Housing Stability: Unknown (8/1/2023)    Housing Stability Vital Sign     Unable to Pay for Housing in the Last Year: Not on file     Number of Places Lived in the Last Year: Not on file     Unstable

## 2023-11-30 DIAGNOSIS — M06.9 RHEUMATOID ARTHRITIS INVOLVING MULTIPLE SITES, UNSPECIFIED WHETHER RHEUMATOID FACTOR PRESENT (HCC): ICD-10-CM

## 2023-11-30 DIAGNOSIS — M05.9 RHEUMATOID ARTHRITIS WITH RHEUMATOID FACTOR, UNSPECIFIED (HCC): ICD-10-CM

## 2023-11-30 RX ORDER — DICLOFENAC SODIUM 75 MG/1
75 TABLET, DELAYED RELEASE ORAL 2 TIMES DAILY PRN
Qty: 60 TABLET | Refills: 0 | OUTPATIENT
Start: 2023-11-30

## 2023-12-07 DIAGNOSIS — M06.9 RHEUMATOID ARTHRITIS INVOLVING MULTIPLE SITES, UNSPECIFIED WHETHER RHEUMATOID FACTOR PRESENT (HCC): ICD-10-CM

## 2023-12-07 DIAGNOSIS — M05.9 RHEUMATOID ARTHRITIS WITH RHEUMATOID FACTOR, UNSPECIFIED (HCC): ICD-10-CM

## 2023-12-07 RX ORDER — DICLOFENAC SODIUM 75 MG/1
75 TABLET, DELAYED RELEASE ORAL 2 TIMES DAILY PRN
Qty: 60 TABLET | Refills: 1 | Status: SHIPPED | OUTPATIENT
Start: 2023-12-07

## 2023-12-07 RX ORDER — METHOTREXATE 2.5 MG/1
TABLET ORAL
Qty: 24 TABLET | Refills: 1 | Status: SHIPPED | OUTPATIENT
Start: 2023-12-07

## 2023-12-07 NOTE — TELEPHONE ENCOUNTER
----- Message from Yael Oneal sent at 12/6/2023  5:41 PM EST -----  Regarding: Diclofenac 75mg  Contact: [phone]  This is my second request for diclofenac. My prescription runs out December 13th. Thank you Regan Party [birth year].

## 2023-12-27 ENCOUNTER — OFFICE VISIT (OUTPATIENT)
Dept: ONCOLOGY | Age: 63
End: 2023-12-27
Payer: MEDICARE

## 2023-12-27 ENCOUNTER — HOSPITAL ENCOUNTER (OUTPATIENT)
Dept: LAB | Age: 63
Discharge: HOME | End: 2023-12-30
Payer: MEDICARE

## 2023-12-27 VITALS
RESPIRATION RATE: 16 BRPM | BODY MASS INDEX: 32.67 KG/M2 | HEIGHT: 68 IN | OXYGEN SATURATION: 94 % | WEIGHT: 215.6 LBS | SYSTOLIC BLOOD PRESSURE: 143 MMHG | TEMPERATURE: 97.8 F | HEART RATE: 61 BPM | DIASTOLIC BLOOD PRESSURE: 85 MMHG

## 2023-12-27 DIAGNOSIS — D75.839 THROMBOCYTOSIS: Primary | ICD-10-CM

## 2023-12-27 DIAGNOSIS — C91.Z0 T-CELL LARGE GRANULAR LYMPHOCYTIC LEUKEMIA (HCC): Primary | ICD-10-CM

## 2023-12-27 DIAGNOSIS — I10 HYPERTENSION, UNSPECIFIED TYPE: ICD-10-CM

## 2023-12-27 DIAGNOSIS — K29.70 GASTRITIS, PRESENCE OF BLEEDING UNSPECIFIED, UNSPECIFIED CHRONICITY, UNSPECIFIED GASTRITIS TYPE: ICD-10-CM

## 2023-12-27 DIAGNOSIS — D75.839 THROMBOCYTOSIS: ICD-10-CM

## 2023-12-27 LAB
ALBUMIN SERPL-MCNC: 3.9 G/DL (ref 3.2–4.6)
ALBUMIN/GLOB SERPL: 1.2 (ref 0.4–1.6)
ALP SERPL-CCNC: 71 U/L (ref 50–136)
ALT SERPL-CCNC: 129 U/L (ref 12–65)
ANION GAP SERPL CALC-SCNC: 2 MMOL/L (ref 2–11)
AST SERPL-CCNC: 64 U/L (ref 15–37)
BASOPHILS # BLD: 0.1 K/UL (ref 0–0.2)
BASOPHILS NFR BLD: 1 % (ref 0–2)
BILIRUB SERPL-MCNC: 0.7 MG/DL (ref 0.2–1.1)
BUN SERPL-MCNC: 16 MG/DL (ref 8–23)
CALCIUM SERPL-MCNC: 9 MG/DL (ref 8.3–10.4)
CHLORIDE SERPL-SCNC: 108 MMOL/L (ref 103–113)
CO2 SERPL-SCNC: 29 MMOL/L (ref 21–32)
CREAT SERPL-MCNC: 1.2 MG/DL (ref 0.8–1.5)
DIFFERENTIAL METHOD BLD: ABNORMAL
EOSINOPHIL # BLD: 0.5 K/UL (ref 0–0.8)
EOSINOPHIL NFR BLD: 6 % (ref 0.5–7.8)
ERYTHROCYTE [DISTWIDTH] IN BLOOD BY AUTOMATED COUNT: 15.9 % (ref 11.9–14.6)
GLOBULIN SER CALC-MCNC: 3.3 G/DL (ref 2.8–4.5)
GLUCOSE SERPL-MCNC: 110 MG/DL (ref 65–100)
HCT VFR BLD AUTO: 43.8 % (ref 41.1–50.3)
HGB BLD-MCNC: 14.7 G/DL (ref 13.6–17.2)
IMM GRANULOCYTES # BLD AUTO: 0.1 K/UL (ref 0–0.5)
IMM GRANULOCYTES NFR BLD AUTO: 1 % (ref 0–5)
LYMPHOCYTES # BLD: 4.2 K/UL (ref 0.5–4.6)
LYMPHOCYTES NFR BLD: 48 % (ref 13–44)
MCH RBC QN AUTO: 31 PG (ref 26.1–32.9)
MCHC RBC AUTO-ENTMCNC: 33.6 G/DL (ref 31.4–35)
MCV RBC AUTO: 92.4 FL (ref 82–102)
MONOCYTES # BLD: 0.8 K/UL (ref 0.1–1.3)
MONOCYTES NFR BLD: 9 % (ref 4–12)
NEUTS SEG # BLD: 3 K/UL (ref 1.7–8.2)
NEUTS SEG NFR BLD: 35 % (ref 43–78)
NRBC # BLD: 0 K/UL (ref 0–0.2)
PLATELET # BLD AUTO: 236 K/UL (ref 150–450)
PMV BLD AUTO: 10.3 FL (ref 9.4–12.3)
POTASSIUM SERPL-SCNC: 4.2 MMOL/L (ref 3.5–5.1)
PROT SERPL-MCNC: 7.2 G/DL (ref 6.3–8.2)
RBC # BLD AUTO: 4.74 M/UL (ref 4.23–5.6)
SODIUM SERPL-SCNC: 139 MMOL/L (ref 136–146)
WBC # BLD AUTO: 8.6 K/UL (ref 4.3–11.1)

## 2023-12-27 PROCEDURE — G8482 FLU IMMUNIZE ORDER/ADMIN: HCPCS | Performed by: INTERNAL MEDICINE

## 2023-12-27 PROCEDURE — 3017F COLORECTAL CA SCREEN DOC REV: CPT | Performed by: INTERNAL MEDICINE

## 2023-12-27 PROCEDURE — 3077F SYST BP >= 140 MM HG: CPT | Performed by: INTERNAL MEDICINE

## 2023-12-27 PROCEDURE — 80053 COMPREHEN METABOLIC PANEL: CPT

## 2023-12-27 PROCEDURE — 36415 COLL VENOUS BLD VENIPUNCTURE: CPT

## 2023-12-27 PROCEDURE — 99215 OFFICE O/P EST HI 40 MIN: CPT | Performed by: INTERNAL MEDICINE

## 2023-12-27 PROCEDURE — 3079F DIAST BP 80-89 MM HG: CPT | Performed by: INTERNAL MEDICINE

## 2023-12-27 PROCEDURE — G8427 DOCREV CUR MEDS BY ELIG CLIN: HCPCS | Performed by: INTERNAL MEDICINE

## 2023-12-27 PROCEDURE — 1036F TOBACCO NON-USER: CPT | Performed by: INTERNAL MEDICINE

## 2023-12-27 PROCEDURE — 85025 COMPLETE CBC W/AUTO DIFF WBC: CPT

## 2023-12-27 PROCEDURE — G8417 CALC BMI ABV UP PARAM F/U: HCPCS | Performed by: INTERNAL MEDICINE

## 2023-12-27 NOTE — PATIENT INSTRUCTIONS
12/27/2023 4.2  3.5 - 5.1 mmol/L Final    Chloride 12/27/2023 108  103 - 113 mmol/L Final    CO2 12/27/2023 29  21 - 32 mmol/L Final    Anion Gap 12/27/2023 2  2 - 11 mmol/L Final    Glucose 12/27/2023 110 (H)  65 - 100 mg/dL Final    BUN 12/27/2023 16  8 - 23 MG/DL Final    Creatinine 12/27/2023 1.20  0.8 - 1.5 MG/DL Final    Est, Glom Filt Rate 12/27/2023 >60  >60 ml/min/1.73m2 Final    Comment:    Pediatric calculator link: [employer].at. org/professionals/kdoqi/gfr_calculatorped     These results are not intended for use in patients <25years of age. eGFR results are calculated without a race factor using  the 2021 CKD-EPI equation. Careful clinical correlation is recommended, particularly when comparing to results calculated using previous equations. The CKD-EPI equation is less accurate in patients with extremes of muscle mass, extra-renal metabolism of creatinine, excessive creatine ingestion, or following therapy that affects renal tubular secretion.       Calcium 12/27/2023 9.0  8.3 - 10.4 MG/DL Final    Total Bilirubin 12/27/2023 0.7  0.2 - 1.1 MG/DL Final    ALT 12/27/2023 129 (H)  12 - 65 U/L Final    AST 12/27/2023 64 (H)  15 - 37 U/L Final    Alk Phosphatase 12/27/2023 71  50 - 136 U/L Final    Total Protein 12/27/2023 7.2  6.3 - 8.2 g/dL Final    Albumin 12/27/2023 3.9  3.2 - 4.6 g/dL Final    Globulin 12/27/2023 3.3  2.8 - 4.5 g/dL Final    Albumin/Globulin Ratio 12/27/2023 1.2  0.4 - 1.6   Final         Treatment Summary has been discussed and given to patient: n/a        -------------------------------------------------------------------------------------------------------------------  Please call our office at [phone] if you have any  of the following symptoms:   Fever of 100.5 or greater  Chills  Shortness of breath  Swelling or pain in one leg    After office hours an answering service is available and will contact a provider for emergencies or if you are experiencing any of the

## 2023-12-27 NOTE — PROGRESS NOTES
[address] [facility], [address]  Phone: [phone]           12/27/2023  Jeremy Zaldivar  1960  [id number]        Jeremy Zaldivar is a 61year old  man who has returned to my clinic for a follow-up visit; he was initially referred to me by Dr. Boubacar Hernandez for evaluation of Leukocytosis and Thrombocytosis, his inflammatory markers were elevated, he does not have Iron overload or Hereditary Hemochromatosis. 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 ALLERGIES:    No known drug allergies. FAMILY HISTORY:     No hematologic disorders. SOCIAL HISTORY:     He is  and lives with his wife. He used to work as a . He denies ever using any tobacco products. PAST MEDICAL HISTORY:     Anxiety Disorder, Colon Cancer, Hypertension, Gastritis, Hyperlipidemia, Iron Deficiency Anemia, Neuropathy, Osteoarthritis, Restless Leg Syndrome, T-Cell LGL, Still's Disease ( adult-onset ) and Polio. ROS:  The patient complained of fatigue and arthralgia; all other systems negative. PHYSICAL EXAM:   The patient was alert, awake and oriented, no acute distress was noted. Oral examination did not reveal any mucosal lesions. Lymph node examination did not reveal any adenopathy. Neck examination revealed a supple neck, no thyromegaly or masses were noted. Chest examination revealed normal vesicular breath sounds. Heart examination revealed S-1 and S-2 without any murmurs. Abdominal examination revealed a non-tender abdomen, bowel sounds were positive, no organomegaly could be appreciated, a laparotomy scar was present. Examination of the extremities did not reveal any tenderness or erythema. Examination of the skin did not reveal any lesions. KPS:    80. LABORATORY INVESTIGATIONS:  CBC showed a WBC count of 8.6, ANC was 3.1, ALC was 4.2, Hemoglobin was 14.7 and Platelets were 724.  Medical problems and test

## 2024-01-05 ENCOUNTER — OFFICE VISIT (OUTPATIENT)
Dept: RHEUMATOLOGY | Age: 64
End: 2024-01-05
Payer: MEDICARE

## 2024-01-05 VITALS
HEIGHT: 68 IN | DIASTOLIC BLOOD PRESSURE: 104 MMHG | SYSTOLIC BLOOD PRESSURE: 160 MMHG | WEIGHT: 221 LBS | HEART RATE: 55 BPM | BODY MASS INDEX: 33.49 KG/M2

## 2024-01-05 DIAGNOSIS — Z79.899 LONG-TERM USE OF HIGH-RISK MEDICATION: ICD-10-CM

## 2024-01-05 DIAGNOSIS — M06.1 STILL'S DISEASE OF ADULT (HCC): Primary | ICD-10-CM

## 2024-01-05 PROCEDURE — G8417 CALC BMI ABV UP PARAM F/U: HCPCS | Performed by: INTERNAL MEDICINE

## 2024-01-05 PROCEDURE — G8427 DOCREV CUR MEDS BY ELIG CLIN: HCPCS | Performed by: INTERNAL MEDICINE

## 2024-01-05 PROCEDURE — 3017F COLORECTAL CA SCREEN DOC REV: CPT | Performed by: INTERNAL MEDICINE

## 2024-01-05 PROCEDURE — 1036F TOBACCO NON-USER: CPT | Performed by: INTERNAL MEDICINE

## 2024-01-05 PROCEDURE — 99214 OFFICE O/P EST MOD 30 MIN: CPT | Performed by: INTERNAL MEDICINE

## 2024-01-05 PROCEDURE — G8482 FLU IMMUNIZE ORDER/ADMIN: HCPCS | Performed by: INTERNAL MEDICINE

## 2024-01-05 RX ORDER — ANAKINRA 100 MG/.67ML
100 INJECTION, SOLUTION SUBCUTANEOUS DAILY
Qty: 28 EACH | Refills: 2 | Status: ACTIVE | OUTPATIENT
Start: 2024-01-05

## 2024-01-05 RX ORDER — FOLIC ACID 1 MG/1
1 TABLET ORAL DAILY
Qty: 30 TABLET | Refills: 2 | Status: SHIPPED | OUTPATIENT
Start: 2024-01-05

## 2024-01-05 RX ORDER — METHOTREXATE 2.5 MG/1
TABLET ORAL
Qty: 16 TABLET | Refills: 2 | Status: SHIPPED | OUTPATIENT
Start: 2024-01-05

## 2024-01-05 RX ORDER — ACETAMINOPHEN 500 MG
500 TABLET ORAL EVERY 6 HOURS PRN
COMMUNITY

## 2024-01-05 ASSESSMENT — ROUTINE ASSESSMENT OF PATIENT INDEX DATA (RAPID3)
ON A SCALE OF ONE TO TEN, HOW MUCH OF A PROBLEM HAS UNUSUAL FATIGUE OR TIREDNESS BEEN FOR YOU OVER THE PAST WEEK?: 4
WHEN YOU AWAKENED IN THE MORNING OVER THE LAST WEEK, PLEASE INDICATE THE AMOUNT OF TIME IT TAKES UNTIL YOU ARE AS LIMBER AS YOU WILL BE FOR THE DAY: 30 MIN
ON A SCALE OF ONE TO TEN, HOW DIFFICULT WAS IT FOR YOU TO COMPLETE THE LISTED DAILY PHYSICAL TASKS OVER THE LAST WEEK: 0.8
ON A SCALE OF ONE TO TEN, HOW MUCH PAIN HAVE YOU HAD BECAUSE OF YOUR CONDITION OVER THE PAST WEEK?: 4
ON A SCALE OF ONE TO TEN, CONSIDERING ALL THE WAYS IN WHICH ILLNESS AND HEALTH CONDITIONS MAY AFFECT YOU AT THIS TIME, PLEASE INDICATE BELOW HOW YOU ARE DOING:: 3

## 2024-01-05 ASSESSMENT — JOINT PAIN
TOTAL NUMBER OF TENDER JOINTS: 8
TOTAL NUMBER OF SWOLLEN JOINTS: 0

## 2024-01-05 NOTE — PROGRESS NOTES
[facility]  Rajat Ge M.D.  [address] , [address]   [facility]  Office : [phone], Fax: [phone]     RHEUMATOLOGY OFFICE VISIT NOTE  Date of Visit:  2024 7:29 AM    Patient Information:  Name:  Adilson Cloud  :  1960  Age:  63 y.o.   Gender:  male      Mr. Cloud is here today for follow-up of Adult onset still's disease and medication monitoring.     Last visit: 10/02/23    History of Present Illness: On talking to he patient today he states that he was diagnosed with T-Cell Leukemia(T-Cell large granular lymphocytic leukemia) in December of last year, and currently is not on a medication for it though. He states he monitors his BP at home, which is around 120's/70's at home. His PCP follows him for his BP as well. Currently he states that he has had a dry cough with some wheezing and has some shortness of breath as well. Denies any chest pain though. Secondary to a habit of sleeping propped up he states he has not had any PND or orthopnea. His current joint complaints are as mentioned below.       Since the last visit, patient is feeling \"fair\".    Pain: 4/10  Location:  Bilateral knee pain and swelling with occasional buckling with no warmth and redness. Some lower back pain. Some neck stiffness with no pain with neck ROM. Occasional headaches once a week when he does not take the diclofenac. No mid back pain. Bilateral wrist pain with some swelling with some warmth but no redness. Some pain and swelling in the middle and ring fingers of both the hands in the PIP with no warmth and redness.   Quality:  Sharp to throbbing pain.   Modifying Factors:  Overuse worsens the joint pain.   Associated Symptoms:  Intermittent pain, tingling and numbness from the hips to the toes with intermittent tingling and numbness of the hands. No limitations with his ADL's.         2024     7:00 AM   [facility]   AM Stiffness 30 min   Pain 4

## 2024-01-29 DIAGNOSIS — M05.9 RHEUMATOID ARTHRITIS WITH RHEUMATOID FACTOR, UNSPECIFIED (HCC): ICD-10-CM

## 2024-01-29 DIAGNOSIS — M06.9 RHEUMATOID ARTHRITIS INVOLVING MULTIPLE SITES, UNSPECIFIED WHETHER RHEUMATOID FACTOR PRESENT (HCC): ICD-10-CM

## 2024-01-30 RX ORDER — DICLOFENAC SODIUM 75 MG/1
75 TABLET, DELAYED RELEASE ORAL 2 TIMES DAILY PRN
Qty: 60 TABLET | Refills: 1 | Status: SHIPPED | OUTPATIENT
Start: 2024-01-30

## 2024-01-30 RX ORDER — DICLOFENAC SODIUM 75 MG/1
75 TABLET, DELAYED RELEASE ORAL 2 TIMES DAILY PRN
Qty: 60 TABLET | Refills: 1 | OUTPATIENT
Start: 2024-01-30

## 2024-01-30 NOTE — TELEPHONE ENCOUNTER
As of last office note (11/07/2023), patient is still taking this medication and will be out of medication by their appointment (03/05/2024). Pended medication has correct quantity and pended to preferred pharmacy.

## 2024-03-05 ENCOUNTER — OFFICE VISIT (OUTPATIENT)
Dept: INTERNAL MEDICINE CLINIC | Facility: CLINIC | Age: 64
End: 2024-03-05
Payer: MEDICARE

## 2024-03-05 ENCOUNTER — OFFICE VISIT (OUTPATIENT)
Dept: RHEUMATOLOGY | Age: 64
End: 2024-03-05
Payer: MEDICARE

## 2024-03-05 VITALS
TEMPERATURE: 98.1 F | DIASTOLIC BLOOD PRESSURE: 101 MMHG | OXYGEN SATURATION: 94 % | HEIGHT: 68 IN | HEART RATE: 54 BPM | WEIGHT: 220 LBS | SYSTOLIC BLOOD PRESSURE: 163 MMHG | BODY MASS INDEX: 33.34 KG/M2

## 2024-03-05 VITALS
WEIGHT: 218 LBS | BODY MASS INDEX: 33.04 KG/M2 | HEART RATE: 56 BPM | HEIGHT: 68 IN | DIASTOLIC BLOOD PRESSURE: 96 MMHG | SYSTOLIC BLOOD PRESSURE: 172 MMHG

## 2024-03-05 DIAGNOSIS — M06.1 ADULT-ONSET STILL'S DISEASE (HCC): ICD-10-CM

## 2024-03-05 DIAGNOSIS — K80.20 CALCULUS OF GALLBLADDER WITHOUT CHOLECYSTITIS WITHOUT OBSTRUCTION: ICD-10-CM

## 2024-03-05 DIAGNOSIS — R73.03 PREDIABETES: ICD-10-CM

## 2024-03-05 DIAGNOSIS — M06.9 RHEUMATOID ARTHRITIS INVOLVING MULTIPLE SITES, UNSPECIFIED WHETHER RHEUMATOID FACTOR PRESENT (HCC): ICD-10-CM

## 2024-03-05 DIAGNOSIS — Z23 ENCOUNTER FOR ADMINISTRATION OF VACCINE: Primary | ICD-10-CM

## 2024-03-05 DIAGNOSIS — Z87.898 HX OF SOLITARY PULMONARY NODULE: ICD-10-CM

## 2024-03-05 DIAGNOSIS — J30.2 SEASONAL ALLERGIES: ICD-10-CM

## 2024-03-05 DIAGNOSIS — Z79.899 LONG-TERM USE OF HIGH-RISK MEDICATION: ICD-10-CM

## 2024-03-05 DIAGNOSIS — M05.9 RHEUMATOID ARTHRITIS WITH RHEUMATOID FACTOR, UNSPECIFIED (HCC): ICD-10-CM

## 2024-03-05 DIAGNOSIS — B91 POLIOMYELITIS OSTEOPATHY OF LEFT LOWER LEG (HCC): ICD-10-CM

## 2024-03-05 DIAGNOSIS — I10 PRIMARY HYPERTENSION: ICD-10-CM

## 2024-03-05 DIAGNOSIS — M06.1 STILL'S DISEASE OF ADULT (HCC): Primary | ICD-10-CM

## 2024-03-05 DIAGNOSIS — E78.2 MIXED HYPERLIPIDEMIA: ICD-10-CM

## 2024-03-05 DIAGNOSIS — K29.60 EROSIVE GASTRITIS: ICD-10-CM

## 2024-03-05 DIAGNOSIS — F51.04 PSYCHOPHYSIOLOGICAL INSOMNIA: ICD-10-CM

## 2024-03-05 DIAGNOSIS — M89.662 POLIOMYELITIS OSTEOPATHY OF LEFT LOWER LEG (HCC): ICD-10-CM

## 2024-03-05 DIAGNOSIS — F41.1 GENERALIZED ANXIETY DISORDER: ICD-10-CM

## 2024-03-05 LAB
ALBUMIN SERPL-MCNC: 4.1 G/DL (ref 3.2–4.6)
ALBUMIN/GLOB SERPL: 1.5 (ref 0.4–1.6)
ALP SERPL-CCNC: 66 U/L (ref 50–136)
ALT SERPL-CCNC: 119 U/L (ref 12–65)
ANION GAP SERPL CALC-SCNC: 5 MMOL/L (ref 2–11)
AST SERPL-CCNC: 49 U/L (ref 15–37)
BILIRUB SERPL-MCNC: 0.7 MG/DL (ref 0.2–1.1)
BUN SERPL-MCNC: 16 MG/DL (ref 8–23)
CALCIUM SERPL-MCNC: 9.7 MG/DL (ref 8.3–10.4)
CHLORIDE SERPL-SCNC: 109 MMOL/L (ref 103–113)
CO2 SERPL-SCNC: 29 MMOL/L (ref 21–32)
CREAT SERPL-MCNC: 1 MG/DL (ref 0.8–1.5)
GLOBULIN SER CALC-MCNC: 2.8 G/DL (ref 2.8–4.5)
GLUCOSE SERPL-MCNC: 104 MG/DL (ref 65–100)
POTASSIUM SERPL-SCNC: 4.6 MMOL/L (ref 3.5–5.1)
PROT SERPL-MCNC: 6.9 G/DL (ref 6.3–8.2)
SODIUM SERPL-SCNC: 143 MMOL/L (ref 136–146)

## 2024-03-05 PROCEDURE — 3017F COLORECTAL CA SCREEN DOC REV: CPT | Performed by: INTERNAL MEDICINE

## 2024-03-05 PROCEDURE — G8417 CALC BMI ABV UP PARAM F/U: HCPCS | Performed by: INTERNAL MEDICINE

## 2024-03-05 PROCEDURE — G8482 FLU IMMUNIZE ORDER/ADMIN: HCPCS | Performed by: INTERNAL MEDICINE

## 2024-03-05 PROCEDURE — 99214 OFFICE O/P EST MOD 30 MIN: CPT | Performed by: INTERNAL MEDICINE

## 2024-03-05 PROCEDURE — 1036F TOBACCO NON-USER: CPT | Performed by: INTERNAL MEDICINE

## 2024-03-05 PROCEDURE — 3080F DIAST BP >= 90 MM HG: CPT | Performed by: INTERNAL MEDICINE

## 2024-03-05 PROCEDURE — 3077F SYST BP >= 140 MM HG: CPT | Performed by: INTERNAL MEDICINE

## 2024-03-05 PROCEDURE — G8427 DOCREV CUR MEDS BY ELIG CLIN: HCPCS | Performed by: INTERNAL MEDICINE

## 2024-03-05 RX ORDER — DICLOFENAC SODIUM 75 MG/1
75 TABLET, DELAYED RELEASE ORAL 2 TIMES DAILY PRN
Qty: 60 TABLET | Refills: 1 | Status: SHIPPED | OUTPATIENT
Start: 2024-03-05

## 2024-03-05 RX ORDER — PANTOPRAZOLE SODIUM 40 MG/1
40 TABLET, DELAYED RELEASE ORAL
Qty: 90 TABLET | Refills: 1 | Status: SHIPPED | OUTPATIENT
Start: 2024-03-05

## 2024-03-05 RX ORDER — METHOTREXATE 2.5 MG/1
TABLET ORAL
Qty: 16 TABLET | Refills: 3 | Status: SHIPPED | OUTPATIENT
Start: 2024-03-05

## 2024-03-05 RX ORDER — ANAKINRA 100 MG/.67ML
100 INJECTION, SOLUTION SUBCUTANEOUS DAILY
Qty: 28 EACH | Refills: 3 | Status: ACTIVE | OUTPATIENT
Start: 2024-03-05

## 2024-03-05 RX ORDER — FLUTICASONE PROPIONATE 50 MCG
1 SPRAY, SUSPENSION (ML) NASAL 2 TIMES DAILY
Qty: 16 G | Refills: 3 | Status: SHIPPED | OUTPATIENT
Start: 2024-03-05

## 2024-03-05 RX ORDER — AMITRIPTYLINE HYDROCHLORIDE 25 MG/1
25 TABLET, FILM COATED ORAL NIGHTLY
Qty: 90 TABLET | Refills: 1 | Status: SHIPPED | OUTPATIENT
Start: 2024-03-05

## 2024-03-05 RX ORDER — METOPROLOL TARTRATE 50 MG/1
50 TABLET, FILM COATED ORAL 2 TIMES DAILY
Qty: 180 TABLET | Refills: 1 | Status: SHIPPED | OUTPATIENT
Start: 2024-03-05

## 2024-03-05 RX ORDER — FOLIC ACID 1 MG/1
1 TABLET ORAL DAILY
Qty: 30 TABLET | Refills: 3 | Status: SHIPPED | OUTPATIENT
Start: 2024-03-05

## 2024-03-05 ASSESSMENT — ROUTINE ASSESSMENT OF PATIENT INDEX DATA (RAPID3)
WHEN YOU AWAKENED IN THE MORNING OVER THE LAST WEEK, PLEASE INDICATE THE AMOUNT OF TIME IT TAKES UNTIL YOU ARE AS LIMBER AS YOU WILL BE FOR THE DAY: 30 MIN
ON A SCALE OF ONE TO TEN, CONSIDERING ALL THE WAYS IN WHICH ILLNESS AND HEALTH CONDITIONS MAY AFFECT YOU AT THIS TIME, PLEASE INDICATE BELOW HOW YOU ARE DOING:: 3
ON A SCALE OF ONE TO TEN, HOW MUCH OF A PROBLEM HAS UNUSUAL FATIGUE OR TIREDNESS BEEN FOR YOU OVER THE PAST WEEK?: 5
ON A SCALE OF ONE TO TEN, HOW MUCH PAIN HAVE YOU HAD BECAUSE OF YOUR CONDITION OVER THE PAST WEEK?: 4
ON A SCALE OF ONE TO TEN, HOW DIFFICULT WAS IT FOR YOU TO COMPLETE THE LISTED DAILY PHYSICAL TASKS OVER THE LAST WEEK: 0.4

## 2024-03-05 ASSESSMENT — JOINT PAIN
TOTAL NUMBER OF SWOLLEN JOINTS: 0
TOTAL NUMBER OF TENDER JOINTS: 12

## 2024-03-05 ASSESSMENT — PATIENT HEALTH QUESTIONNAIRE - PHQ9
1. LITTLE INTEREST OR PLEASURE IN DOING THINGS: 0
SUM OF ALL RESPONSES TO PHQ QUESTIONS 1-9: 0
2. FEELING DOWN, DEPRESSED OR HOPELESS: 0
SUM OF ALL RESPONSES TO PHQ QUESTIONS 1-9: 0
SUM OF ALL RESPONSES TO PHQ9 QUESTIONS 1 & 2: 0

## 2024-03-05 NOTE — ACP (ADVANCE CARE PLANNING)
Advance Care Planning   The patient has the following advanced directives on file:  Advance Directives       Power of  Living Will ACP-Advance Directive ACP-Power of     Not on File Not on File Not on File Not on File            The patient has appointed the following active healthcare agents:    Primary Decision Maker: Mallika Cloud - Spouse - [phone]    The Patient has the following current code status:    Code Status: Prior    Visit Documentation:  I discussed Advance Care Planning with Adilson Cloud today which included the importance of making their choices for care and treatment in the case of a health event that adversely affects their decision-making abilities. He has not completed the Advance Care Directives. He does not have an active health care agent at this time.  Adilson Cloud was encouraged to complete the declaration forms and provide a signed copy of his medical records.  I advised patient we would continue this discussion at future visits.     Charo Meza  3/5/2024

## 2024-03-05 NOTE — PROGRESS NOTES
Adilson Cloud (: 1960) is a 63 y.o. male, here for evaluation of the following chief complaint(s):  4 month follow up (Pt is here for routine 4 month check up.)       ASSESSMENT/PLAN:  1. Encounter for administration of vaccine  2. Poliomyelitis osteopathy of left lower leg (HCC)  3. Seasonal allergies  -     fluticasone (FLONASE) 50 MCG/ACT nasal spray; 1 spray by Nasal route 2 times daily, Disp-16 g, R-3Normal  4. Mixed hyperlipidemia  5. Generalized anxiety disorder  6. Hx of solitary pulmonary nodule  Assessment & Plan:   2023 chest xr x 2 without mention of nodule.   7. Calculus of gallbladder without cholecystitis without obstruction  Assessment & Plan:   Elevated 2023 liver enzymes. Will check CMP.  Orders:  -     Comprehensive Metabolic Panel; Future  8. Psychophysiological insomnia  -     amitriptyline (ELAVIL) 25 MG tablet; Take 1 tablet by mouth nightly, Disp-90 tablet, R-1Normal  9. Erosive gastritis  -     pantoprazole (PROTONIX) 40 MG tablet; Take 1 tablet by mouth every morning (before breakfast), Disp-90 tablet, R-1Normal  10. Adult-onset Still's disease (HCC)  11. Primary hypertension  -     metoprolol tartrate (LOPRESSOR) 50 MG tablet; Take 1 tablet by mouth 2 times daily, Disp-180 tablet, R-1Normal  12. Prediabetes  -     Hemoglobin A1C; Future  13. Rheumatoid arthritis with rheumatoid factor, unspecified  -     diclofenac (VOLTAREN) 75 MG EC tablet; Take 1 tablet by mouth 2 times daily as needed for Pain, Disp-60 tablet, R-1Normal  14. Rheumatoid arthritis involving multiple sites, unspecified whether rheumatoid factor present (HCC)  -     diclofenac (VOLTAREN) 75 MG EC tablet; Take 1 tablet by mouth 2 times daily as needed for Pain, Disp-60 tablet, R-1Normal             SUBJECTIVE/OBJECTIVE:  HP Patient is a complex 63-year-old man presenting for follow-up visit.  Patient has recently seen rheumatology for treatment of adult stills disease with rheumatoid

## 2024-03-05 NOTE — PROGRESS NOTES
Doppler interrogation performed.  The entire  length of all of the arteries of the extremity were evaluated.    COMPARISON: None.    SEGMENTAL BP:  The right and left lower extremity segmental blood pressures are  fairly symmetric and unremarkable.     TIERRA:  Right = 1.28 with digital pressure of 117             Left = 1.0 with digital pressure of 130    RIGHT LOWER EXTREMITY:  Peak systolic velocities-- within normal limits. Normal  triphasic waveform throughout.    LEFT LOWER EXTREMITY:  Peak systolic velocities-- within normal limits. Normal  triphasic waveforms throughout.    ABDOMEN:  No aneurysm is seen.  Impression: No significant arterial disease identified.    US ABDOMEN COMPLETE  Narrative: EXAM: Abdomen ultrasound.   INDICATION: Elevated LFTs, cholelithiasis, leukocytosis.  Comparison: Abdomen and pelvis CT June 08, 2023.    FINDINGS:  Pancreas: Cannot well visualized pancreas given overlying bowel. Gas obscuration  of the pancreas.    Liver: Increased echogenicity of the liver with borderline hepatomegaly  measuring 18.9 cm at the midclavicular line. No suspicious hepatic masses. The  main portal vein is patent with an appropriate direction of flow.    Gallbladder: Gallbladder wall is normal in thickness. The gallbladder is not  distended. Gallstones are evident in the gallbladder. No pericholecystic fluid.  Absent sonographic May sign per the sonographer.    Biliary: No biliary duct dilatation.    Spleen: No splenomegaly or focal splenic lesion.    Right kidney: The right kidney is normal in echogenicity and normal in size  measuring 11.5 cm. No contour deforming mass. No hydronephrosis or perinephric  fluid.     Left kidney: The left kidney is normal in echogenicity and normal in size  measuring 13.9 cm. No contour deforming mass. Left interpolar renal cyst  measuring 1.8 x 2 x 2 cm with a single thin septation. No hydronephrosis or  perinephric fluid.     Abdominal Aorta: Cannot evaluate the mid and

## 2024-03-06 LAB
EST. AVERAGE GLUCOSE BLD GHB EST-MCNC: 108 MG/DL
HBA1C MFR BLD: 5.4 % (ref 4.8–5.6)

## 2024-03-28 DIAGNOSIS — C91.Z0 T-CELL LARGE GRANULAR LYMPHOCYTIC LEUKEMIA (HCC): Primary | ICD-10-CM

## 2024-03-28 DIAGNOSIS — D75.839 THROMBOCYTOSIS: ICD-10-CM

## 2024-03-29 ENCOUNTER — OFFICE VISIT (OUTPATIENT)
Dept: ONCOLOGY | Age: 64
End: 2024-03-29
Payer: MEDICARE

## 2024-03-29 ENCOUNTER — HOSPITAL ENCOUNTER (OUTPATIENT)
Dept: LAB | Age: 64
End: 2024-03-29
Payer: MEDICARE

## 2024-03-29 VITALS
RESPIRATION RATE: 16 BRPM | WEIGHT: 216.7 LBS | OXYGEN SATURATION: 97 % | TEMPERATURE: 97.6 F | HEIGHT: 68 IN | SYSTOLIC BLOOD PRESSURE: 147 MMHG | HEART RATE: 58 BPM | DIASTOLIC BLOOD PRESSURE: 77 MMHG | BODY MASS INDEX: 32.84 KG/M2

## 2024-03-29 DIAGNOSIS — C91.Z0 T-CELL LARGE GRANULAR LYMPHOCYTIC LEUKEMIA (HCC): Primary | ICD-10-CM

## 2024-03-29 DIAGNOSIS — D75.839 THROMBOCYTOSIS: ICD-10-CM

## 2024-03-29 DIAGNOSIS — K29.70 GASTRITIS, PRESENCE OF BLEEDING UNSPECIFIED, UNSPECIFIED CHRONICITY, UNSPECIFIED GASTRITIS TYPE: ICD-10-CM

## 2024-03-29 DIAGNOSIS — I10 HYPERTENSION, UNSPECIFIED TYPE: ICD-10-CM

## 2024-03-29 LAB
ALBUMIN SERPL-MCNC: 3.6 G/DL (ref 3.2–4.6)
ALBUMIN/GLOB SERPL: 1.1 (ref 0.4–1.6)
ALP SERPL-CCNC: 73 U/L (ref 50–136)
ALT SERPL-CCNC: 139 U/L (ref 12–65)
ANION GAP SERPL CALC-SCNC: 3 MMOL/L (ref 2–11)
AST SERPL-CCNC: 68 U/L (ref 15–37)
BASOPHILS # BLD: 0.1 K/UL (ref 0–0.2)
BASOPHILS NFR BLD: 1 % (ref 0–2)
BILIRUB SERPL-MCNC: 0.8 MG/DL (ref 0.2–1.1)
BUN SERPL-MCNC: 21 MG/DL (ref 8–23)
CALCIUM SERPL-MCNC: 9.2 MG/DL (ref 8.3–10.4)
CHLORIDE SERPL-SCNC: 114 MMOL/L (ref 103–113)
CO2 SERPL-SCNC: 26 MMOL/L (ref 21–32)
CREAT SERPL-MCNC: 1.2 MG/DL (ref 0.8–1.5)
DIFFERENTIAL METHOD BLD: ABNORMAL
EOSINOPHIL # BLD: 0.8 K/UL (ref 0–0.8)
EOSINOPHIL NFR BLD: 10 % (ref 0.5–7.8)
ERYTHROCYTE [DISTWIDTH] IN BLOOD BY AUTOMATED COUNT: 13.3 % (ref 11.9–14.6)
GLOBULIN SER CALC-MCNC: 3.3 G/DL (ref 2.8–4.5)
GLUCOSE SERPL-MCNC: 123 MG/DL (ref 65–100)
HCT VFR BLD AUTO: 43.6 % (ref 41.1–50.3)
HGB BLD-MCNC: 15 G/DL (ref 13.6–17.2)
IMM GRANULOCYTES # BLD AUTO: 0 K/UL (ref 0–0.5)
IMM GRANULOCYTES NFR BLD AUTO: 0 % (ref 0–5)
LYMPHOCYTES # BLD: 4.7 K/UL (ref 0.5–4.6)
LYMPHOCYTES NFR BLD: 60 % (ref 13–44)
MCH RBC QN AUTO: 32.3 PG (ref 26.1–32.9)
MCHC RBC AUTO-ENTMCNC: 34.4 G/DL (ref 31.4–35)
MCV RBC AUTO: 94 FL (ref 82–102)
MONOCYTES # BLD: 0.7 K/UL (ref 0.1–1.3)
MONOCYTES NFR BLD: 9 % (ref 4–12)
NEUTS SEG # BLD: 1.5 K/UL (ref 1.7–8.2)
NEUTS SEG NFR BLD: 20 % (ref 43–78)
NRBC # BLD: 0 K/UL (ref 0–0.2)
PLATELET # BLD AUTO: 194 K/UL (ref 150–450)
PMV BLD AUTO: 10.5 FL (ref 9.4–12.3)
POTASSIUM SERPL-SCNC: 4 MMOL/L (ref 3.5–5.1)
PROT SERPL-MCNC: 6.9 G/DL (ref 6.3–8.2)
RBC # BLD AUTO: 4.64 M/UL (ref 4.23–5.6)
SODIUM SERPL-SCNC: 143 MMOL/L (ref 136–146)
WBC # BLD AUTO: 7.8 K/UL (ref 4.3–11.1)

## 2024-03-29 PROCEDURE — 99215 OFFICE O/P EST HI 40 MIN: CPT | Performed by: INTERNAL MEDICINE

## 2024-03-29 PROCEDURE — 3077F SYST BP >= 140 MM HG: CPT | Performed by: INTERNAL MEDICINE

## 2024-03-29 PROCEDURE — 1036F TOBACCO NON-USER: CPT | Performed by: INTERNAL MEDICINE

## 2024-03-29 PROCEDURE — G8427 DOCREV CUR MEDS BY ELIG CLIN: HCPCS | Performed by: INTERNAL MEDICINE

## 2024-03-29 PROCEDURE — 3017F COLORECTAL CA SCREEN DOC REV: CPT | Performed by: INTERNAL MEDICINE

## 2024-03-29 PROCEDURE — G8417 CALC BMI ABV UP PARAM F/U: HCPCS | Performed by: INTERNAL MEDICINE

## 2024-03-29 PROCEDURE — 3078F DIAST BP <80 MM HG: CPT | Performed by: INTERNAL MEDICINE

## 2024-03-29 PROCEDURE — 80053 COMPREHEN METABOLIC PANEL: CPT

## 2024-03-29 PROCEDURE — 85025 COMPLETE CBC W/AUTO DIFF WBC: CPT

## 2024-03-29 PROCEDURE — 36415 COLL VENOUS BLD VENIPUNCTURE: CPT

## 2024-03-29 PROCEDURE — G8482 FLU IMMUNIZE ORDER/ADMIN: HCPCS | Performed by: INTERNAL MEDICINE

## 2024-03-29 NOTE — PATIENT INSTRUCTIONS
Patient Information from Today's Visit        Treatment Summary has been discussed and given to patient:N/A  Follow Up:   We will bring you back in August for a follow up with  and lab work prior  Lab work is stable today    Please refer to After Visit Summary or [employer] for upcoming appointment information. If you have any questions regarding your upcoming schedule please reach out to your care team through [employer] or call [phone].          -------------------------------------------------------------------------------------------------------------------  Please call our office at [phone] if you have any  of the following symptoms:   Fever of 100.5 or greater  Chills  Shortness of breath  Swelling or pain in one leg    After office hours an answering service is available and will contact a provider for emergencies or if you are experiencing any of the above symptoms.    Patient has My Chart.  My Chart log in information explained on the after visit summary printout at the check-out desk.    Domi Fregoso MA

## 2024-03-29 NOTE — PROGRESS NOTES
[address] [address]  Phone: [phone]           3/29/2024  Adilson Cloud  1960  [id number]        Adilson Cloud is a 63 year old  man who has returned to my clinic for a follow-up visit; he was initially referred to me by Dr. Promise Johnston for evaluation of Leukocytosis and Thrombocytosis, his inflammatory markers were elevated, he does not have Iron overload or Hereditary Hemochromatosis. 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        ALLERGIES:    No known drug allergies.        FAMILY HISTORY:     No hematologic disorders.        SOCIAL HISTORY:     He is  and lives with his wife. He used to work as a . He denies ever using any tobacco products.        PAST MEDICAL HISTORY:     Anxiety Disorder, Colon Cancer, Hypertension, Gastritis, Hyperlipidemia, Iron Deficiency Anemia, Neuropathy, Osteoarthritis, Restless Leg Syndrome, T-Cell LGL, Still's Disease ( adult-onset ) and Polio.        ROS:  The patient complained of fatigue and arthralgia; all other systems negative.        PHYSICAL EXAM:   The patient was alert, awake and oriented, no acute distress was noted. Oral examination did not reveal any mucosal lesions. Lymph node examination did not reveal any adenopathy. Neck examination revealed a supple neck, no thyromegaly or masses were noted. Chest examination revealed normal vesicular breath sounds. Heart examination revealed S-1 and S-2 without any murmurs. Abdominal examination revealed a non-tender abdomen, bowel sounds were positive, no organomegaly could be appreciated, a laparotomy scar was present. Examination of the extremities did not reveal any tenderness or erythema. Examination of the skin did not reveal any lesions.        KPS:    80.        LABORATORY INVESTIGATIONS:  CBC showed a WBC count of 7.8, ANC was 1.5, ALC was 4.7, Hemoglobin was 15.1 and Platelets were 194. Medical problems and test results

## 2024-04-05 DIAGNOSIS — M06.1 STILL'S DISEASE OF ADULT (HCC): ICD-10-CM

## 2024-04-08 RX ORDER — FOLIC ACID 1 MG/1
1000 TABLET ORAL DAILY
Qty: 30 TABLET | Refills: 0 | OUTPATIENT
Start: 2024-04-08

## 2024-05-29 DIAGNOSIS — M05.9 RHEUMATOID ARTHRITIS WITH RHEUMATOID FACTOR, UNSPECIFIED (HCC): ICD-10-CM

## 2024-05-29 DIAGNOSIS — M06.9 RHEUMATOID ARTHRITIS INVOLVING MULTIPLE SITES, UNSPECIFIED WHETHER RHEUMATOID FACTOR PRESENT (HCC): ICD-10-CM

## 2024-05-29 RX ORDER — DICLOFENAC SODIUM 75 MG/1
75 TABLET, DELAYED RELEASE ORAL 2 TIMES DAILY PRN
Qty: 120 TABLET | Refills: 0 | Status: SHIPPED | OUTPATIENT
Start: 2024-05-29

## 2024-05-29 NOTE — TELEPHONE ENCOUNTER
Pt requests a refill on Amitriptyline and Diclofenac.    A 6 mos supply of Amitriptyline was sent to his pharmacy on 3/5/24. 3 mos supply of Diclofenac was sent to his pharmacy on 3/5/24.    Pending enough to last until his 8/2/24 appt with Dr. Cerrato.

## 2024-07-09 ENCOUNTER — OFFICE VISIT (OUTPATIENT)
Dept: RHEUMATOLOGY | Age: 64
End: 2024-07-09
Payer: MEDICARE

## 2024-07-09 VITALS
WEIGHT: 222 LBS | BODY MASS INDEX: 33.65 KG/M2 | SYSTOLIC BLOOD PRESSURE: 154 MMHG | DIASTOLIC BLOOD PRESSURE: 109 MMHG | HEART RATE: 59 BPM | HEIGHT: 68 IN

## 2024-07-09 DIAGNOSIS — Z79.899 LONG-TERM USE OF HIGH-RISK MEDICATION: ICD-10-CM

## 2024-07-09 DIAGNOSIS — M06.1 STILL'S DISEASE OF ADULT (HCC): ICD-10-CM

## 2024-07-09 DIAGNOSIS — M06.1 STILL'S DISEASE OF ADULT (HCC): Primary | ICD-10-CM

## 2024-07-09 DIAGNOSIS — Z11.1 SCREENING EXAMINATION FOR PULMONARY TUBERCULOSIS: ICD-10-CM

## 2024-07-09 LAB
ALBUMIN SERPL-MCNC: 4.2 G/DL (ref 3.2–4.6)
ALBUMIN/GLOB SERPL: 1.5 (ref 1–1.9)
ALP SERPL-CCNC: 82 U/L (ref 40–129)
ALT SERPL-CCNC: 105 U/L (ref 12–65)
ANION GAP SERPL CALC-SCNC: 10 MMOL/L (ref 9–18)
AST SERPL-CCNC: 55 U/L (ref 15–37)
BASOPHILS # BLD: 0.1 K/UL (ref 0–0.2)
BASOPHILS NFR BLD: 1 % (ref 0–2)
BILIRUB SERPL-MCNC: 0.4 MG/DL (ref 0–1.2)
BUN SERPL-MCNC: 14 MG/DL (ref 8–23)
CALCIUM SERPL-MCNC: 9.4 MG/DL (ref 8.8–10.2)
CHLORIDE SERPL-SCNC: 104 MMOL/L (ref 98–107)
CO2 SERPL-SCNC: 24 MMOL/L (ref 20–28)
CREAT SERPL-MCNC: 1 MG/DL (ref 0.8–1.3)
DIFFERENTIAL METHOD BLD: ABNORMAL
EOSINOPHIL # BLD: 0.6 K/UL (ref 0–0.8)
EOSINOPHIL NFR BLD: 8 % (ref 0.5–7.8)
ERYTHROCYTE [DISTWIDTH] IN BLOOD BY AUTOMATED COUNT: 13.6 % (ref 11.9–14.6)
GLOBULIN SER CALC-MCNC: 2.9 G/DL (ref 2.3–3.5)
GLUCOSE SERPL-MCNC: 100 MG/DL (ref 70–99)
HCT VFR BLD AUTO: 47.3 % (ref 41.1–50.3)
HGB BLD-MCNC: 16.5 G/DL (ref 13.6–17.2)
IMM GRANULOCYTES # BLD AUTO: 0 K/UL (ref 0–0.5)
IMM GRANULOCYTES NFR BLD AUTO: 0 % (ref 0–5)
LYMPHOCYTES # BLD: 4.5 K/UL (ref 0.5–4.6)
LYMPHOCYTES NFR BLD: 52 % (ref 13–44)
MCH RBC QN AUTO: 33.4 PG (ref 26.1–32.9)
MCHC RBC AUTO-ENTMCNC: 34.9 G/DL (ref 31.4–35)
MCV RBC AUTO: 95.7 FL (ref 82–102)
MONOCYTES # BLD: 0.7 K/UL (ref 0.1–1.3)
MONOCYTES NFR BLD: 9 % (ref 4–12)
NEUTS SEG # BLD: 2.6 K/UL (ref 1.7–8.2)
NEUTS SEG NFR BLD: 30 % (ref 43–78)
NRBC # BLD: 0 K/UL (ref 0–0.2)
PLATELET # BLD AUTO: 243 K/UL (ref 150–450)
PMV BLD AUTO: 11.2 FL (ref 9.4–12.3)
POTASSIUM SERPL-SCNC: 5.1 MMOL/L (ref 3.5–5.1)
PROT SERPL-MCNC: 7 G/DL (ref 6.3–8.2)
RBC # BLD AUTO: 4.94 M/UL (ref 4.23–5.6)
SODIUM SERPL-SCNC: 138 MMOL/L (ref 136–145)
WBC # BLD AUTO: 8.6 K/UL (ref 4.3–11.1)

## 2024-07-09 PROCEDURE — G8417 CALC BMI ABV UP PARAM F/U: HCPCS | Performed by: INTERNAL MEDICINE

## 2024-07-09 PROCEDURE — 3017F COLORECTAL CA SCREEN DOC REV: CPT | Performed by: INTERNAL MEDICINE

## 2024-07-09 PROCEDURE — 1036F TOBACCO NON-USER: CPT | Performed by: INTERNAL MEDICINE

## 2024-07-09 PROCEDURE — G8427 DOCREV CUR MEDS BY ELIG CLIN: HCPCS | Performed by: INTERNAL MEDICINE

## 2024-07-09 PROCEDURE — G2211 COMPLEX E/M VISIT ADD ON: HCPCS | Performed by: INTERNAL MEDICINE

## 2024-07-09 PROCEDURE — 99214 OFFICE O/P EST MOD 30 MIN: CPT | Performed by: INTERNAL MEDICINE

## 2024-07-09 RX ORDER — FOLIC ACID 1 MG/1
1 TABLET ORAL DAILY
Qty: 30 TABLET | Refills: 3 | Status: SHIPPED | OUTPATIENT
Start: 2024-07-09

## 2024-07-09 RX ORDER — METHOTREXATE 2.5 MG/1
TABLET ORAL
Qty: 12 TABLET | Refills: 3 | Status: SHIPPED | OUTPATIENT
Start: 2024-07-09

## 2024-07-09 RX ORDER — ANAKINRA 100 MG/.67ML
100 INJECTION, SOLUTION SUBCUTANEOUS DAILY
Qty: 28 EACH | Refills: 3 | Status: ACTIVE | OUTPATIENT
Start: 2024-07-09

## 2024-07-09 ASSESSMENT — ROUTINE ASSESSMENT OF PATIENT INDEX DATA (RAPID3)
ON A SCALE OF ONE TO TEN, HOW DIFFICULT WAS IT FOR YOU TO COMPLETE THE LISTED DAILY PHYSICAL TASKS OVER THE LAST WEEK: 0.6
WHEN YOU AWAKENED IN THE MORNING OVER THE LAST WEEK, PLEASE INDICATE THE AMOUNT OF TIME IT TAKES UNTIL YOU ARE AS LIMBER AS YOU WILL BE FOR THE DAY: 15 MIN
ON A SCALE OF ONE TO TEN, HOW MUCH OF A PROBLEM HAS UNUSUAL FATIGUE OR TIREDNESS BEEN FOR YOU OVER THE PAST WEEK?: 6
ON A SCALE OF ONE TO TEN, CONSIDERING ALL THE WAYS IN WHICH ILLNESS AND HEALTH CONDITIONS MAY AFFECT YOU AT THIS TIME, PLEASE INDICATE BELOW HOW YOU ARE DOING:: 4
ON A SCALE OF ONE TO TEN, HOW MUCH PAIN HAVE YOU HAD BECAUSE OF YOUR CONDITION OVER THE PAST WEEK?: 4

## 2024-07-09 ASSESSMENT — JOINT PAIN
TOTAL NUMBER OF TENDER JOINTS: 10
TOTAL NUMBER OF SWOLLEN JOINTS: 0

## 2024-07-09 NOTE — PROGRESS NOTES
ENDOSCOPY    EXCISIONAL HEMORRHOIDECTOMY      EXPLORATORY LAPAROTOMY W/ BOWEL RESECTION      HERNIA REPAIR      LEG SURGERY Left     UPPER GASTROINTESTINAL ENDOSCOPY N/A 8/14/2023    EGD BIOPSY performed by Krystle Benz MD at [facility] ENDOSCOPY       Family History  Family History   Problem Relation Age of Onset    Heart Disease Mother     High Blood Pressure Mother     Kidney Disease Mother     Cancer Father        Social History  Social History     Socioeconomic History    Marital status:      Spouse name: None    Number of children: None    Years of education: None    Highest education level: None   Tobacco Use    Smoking status: Never    Smokeless tobacco: Never   Vaping Use    Vaping Use: Never used   Substance and Sexual Activity    Alcohol use: Not Currently    Drug use: Yes     Types: Prescription     Comment: Taking Ativan. Used street drugs in the past.    Sexual activity: Not Currently     Partners: Female     Social Determinants of Health     Financial Resource Strain: Low Risk  (8/1/2023)    Overall Financial Resource Strain (CARDIA)     Difficulty of Paying Living Expenses: Not hard at all   Transportation Needs: Unknown (8/1/2023)    PRAPARE - Transportation     Lack of Transportation (Non-Medical): No   Physical Activity: Unknown (7/28/2023)    Exercise Vital Sign     Days of Exercise per Week: Patient declined   Housing Stability: Unknown (8/1/2023)    Housing Stability Vital Sign     Unstable Housing in the Last Year: No               Allergy:  Allergies   Allergen Reactions    Naloxone     Neurontin [Gabapentin] Anxiety         Current Medications:  Outpatient Encounter Medications as of 7/9/2024   Medication Sig Dispense Refill    anakinra (KINERET) 100 MG/0.67ML SOSY injection Inject 0.67 mLs into the skin daily 28 each 3    methotrexate (RHEUMATREX) 2.5 MG chemo tablet Take 3 tabs once a week on Tuesday's after supper. 12 tablet 3    folic acid (FOLVITE) 1 MG tablet Take 1 tablet by mouth

## 2024-07-10 LAB — CRP SERPL-MCNC: 2 MG/L (ref 0–10)

## 2024-07-13 LAB
M TB IFN-G BLD-IMP: NEGATIVE
M TB IFN-G CD4+ T-CELLS BLD-ACNC: 0.04 IU/ML
M TBIFN-G CD4+ CD8+T-CELLS BLD-ACNC: 0.01 IU/ML
QUANTIFERON CRITERIA: NORMAL
QUANTIFERON MITOGEN VALUE: >10 IU/ML
QUANTIFERON NIL VALUE: 0.03 IU/ML

## 2024-07-30 SDOH — HEALTH STABILITY: PHYSICAL HEALTH: ON AVERAGE, HOW MANY DAYS PER WEEK DO YOU ENGAGE IN MODERATE TO STRENUOUS EXERCISE (LIKE A BRISK WALK)?: 0 DAYS

## 2024-08-02 ENCOUNTER — OFFICE VISIT (OUTPATIENT)
Dept: INTERNAL MEDICINE CLINIC | Facility: CLINIC | Age: 64
End: 2024-08-02

## 2024-08-02 VITALS
HEIGHT: 68 IN | TEMPERATURE: 98.2 F | DIASTOLIC BLOOD PRESSURE: 85 MMHG | BODY MASS INDEX: 33.19 KG/M2 | SYSTOLIC BLOOD PRESSURE: 132 MMHG | WEIGHT: 219 LBS | HEART RATE: 55 BPM | OXYGEN SATURATION: 97 %

## 2024-08-02 DIAGNOSIS — F41.1 GENERALIZED ANXIETY DISORDER: ICD-10-CM

## 2024-08-02 DIAGNOSIS — R06.83 SNORING: ICD-10-CM

## 2024-08-02 DIAGNOSIS — G47.9 SLEEP DISTURBANCE: ICD-10-CM

## 2024-08-02 DIAGNOSIS — M06.1 ADULT-ONSET STILL'S DISEASE (HCC): Primary | ICD-10-CM

## 2024-08-02 DIAGNOSIS — K21.9 GASTROESOPHAGEAL REFLUX DISEASE WITHOUT ESOPHAGITIS: ICD-10-CM

## 2024-08-02 DIAGNOSIS — Z00.00 MEDICARE ANNUAL WELLNESS VISIT, SUBSEQUENT: ICD-10-CM

## 2024-08-02 DIAGNOSIS — G47.19 EXCESSIVE DAYTIME SLEEPINESS: ICD-10-CM

## 2024-08-02 DIAGNOSIS — E78.2 MIXED HYPERLIPIDEMIA: ICD-10-CM

## 2024-08-02 DIAGNOSIS — C91.Z0 T-CELL LARGE GRANULAR LYMPHOCYTIC LEUKEMIA (HCC): ICD-10-CM

## 2024-08-02 PROBLEM — Z87.828 HISTORY OF GUNSHOT WOUND: Status: RESOLVED | Noted: 2023-05-30 | Resolved: 2024-08-02

## 2024-08-02 PROBLEM — E87.1 HYPONATREMIA: Status: RESOLVED | Noted: 2023-07-05 | Resolved: 2024-08-02

## 2024-08-02 PROBLEM — Z87.898 HX OF SOLITARY PULMONARY NODULE: Status: RESOLVED | Noted: 2023-05-30 | Resolved: 2024-08-02

## 2024-08-02 PROBLEM — Z86.12 HX OF POST-POLIO SYNDROME: Status: RESOLVED | Noted: 2023-05-30 | Resolved: 2024-08-02

## 2024-08-02 PROBLEM — M79.10 MUSCULAR PAIN: Status: RESOLVED | Noted: 2023-06-16 | Resolved: 2024-08-02

## 2024-08-02 PROBLEM — C18.7 CANCER OF SIGMOID COLON (HCC): Status: RESOLVED | Noted: 2018-10-19 | Resolved: 2024-08-02

## 2024-08-02 PROBLEM — R53.1 WEAKNESS: Status: RESOLVED | Noted: 2023-05-30 | Resolved: 2024-08-02

## 2024-08-02 PROBLEM — Z18.10: Status: RESOLVED | Noted: 2023-05-30 | Resolved: 2024-08-02

## 2024-08-02 PROBLEM — K29.60 EROSIVE GASTRITIS: Status: RESOLVED | Noted: 2023-08-21 | Resolved: 2024-08-02

## 2024-08-02 PROBLEM — R63.4 WEIGHT LOSS: Status: RESOLVED | Noted: 2023-07-11 | Resolved: 2024-08-02

## 2024-08-02 PROBLEM — R61 NIGHT SWEATS: Status: RESOLVED | Noted: 2023-05-30 | Resolved: 2024-08-02

## 2024-08-02 PROBLEM — N45.1 LEFT EPIDIDYMITIS: Status: RESOLVED | Noted: 2023-06-09 | Resolved: 2024-08-02

## 2024-08-02 PROBLEM — R11.2 NAUSEA AND VOMITING: Status: RESOLVED | Noted: 2023-07-11 | Resolved: 2024-08-02

## 2024-08-02 PROBLEM — R74.01 TRANSAMINITIS: Status: RESOLVED | Noted: 2023-06-08 | Resolved: 2024-08-02

## 2024-08-02 PROBLEM — R65.10 SIRS (SYSTEMIC INFLAMMATORY RESPONSE SYNDROME) (HCC): Status: RESOLVED | Noted: 2023-06-08 | Resolved: 2024-08-02

## 2024-08-02 PROBLEM — K62.5 HEMORRHAGE OF RECTUM: Status: RESOLVED | Noted: 2023-07-11 | Resolved: 2024-08-02

## 2024-08-02 RX ORDER — TRAZODONE HYDROCHLORIDE 50 MG/1
50 TABLET ORAL NIGHTLY
Qty: 90 TABLET | Refills: 1 | Status: SHIPPED | OUTPATIENT
Start: 2024-08-02

## 2024-08-02 SDOH — ECONOMIC STABILITY: FOOD INSECURITY: WITHIN THE PAST 12 MONTHS, YOU WORRIED THAT YOUR FOOD WOULD RUN OUT BEFORE YOU GOT MONEY TO BUY MORE.: NEVER TRUE

## 2024-08-02 SDOH — ECONOMIC STABILITY: FOOD INSECURITY: WITHIN THE PAST 12 MONTHS, THE FOOD YOU BOUGHT JUST DIDN'T LAST AND YOU DIDN'T HAVE MONEY TO GET MORE.: NEVER TRUE

## 2024-08-02 SDOH — ECONOMIC STABILITY: INCOME INSECURITY: HOW HARD IS IT FOR YOU TO PAY FOR THE VERY BASICS LIKE FOOD, HOUSING, MEDICAL CARE, AND HEATING?: NOT HARD AT ALL

## 2024-08-02 ASSESSMENT — LIFESTYLE VARIABLES
HOW MANY STANDARD DRINKS CONTAINING ALCOHOL DO YOU HAVE ON A TYPICAL DAY: PATIENT DOES NOT DRINK
HOW OFTEN DO YOU HAVE A DRINK CONTAINING ALCOHOL: NEVER

## 2024-08-02 ASSESSMENT — PATIENT HEALTH QUESTIONNAIRE - PHQ9
SUM OF ALL RESPONSES TO PHQ QUESTIONS 1-9: 0
1. LITTLE INTEREST OR PLEASURE IN DOING THINGS: NOT AT ALL
SUM OF ALL RESPONSES TO PHQ9 QUESTIONS 1 & 2: 0
2. FEELING DOWN, DEPRESSED OR HOPELESS: NOT AT ALL

## 2024-08-02 NOTE — PATIENT INSTRUCTIONS

## 2024-08-02 NOTE — PROGRESS NOTES
Sleeping Yes Arturo Cerrato DO   anakinra (KINERET) 100 MG/0.67ML SOSY injection Inject 0.67 mLs into the skin daily Yes Rajat Ge MD   folic acid (FOLVITE) 1 MG tablet Take 1 tablet by mouth daily Yes Rajat Ge MD   diclofenac (VOLTAREN) 75 MG EC tablet Take 1 tablet by mouth 2 times daily as needed for Pain Yes Arturo Cerrato DO   metoprolol tartrate (LOPRESSOR) 50 MG tablet Take 1 tablet by mouth 2 times daily Yes Gallo Bradford DO   pantoprazole (PROTONIX) 40 MG tablet Take 1 tablet by mouth every morning (before breakfast) Yes Gallo Bradford DO   fluticasone (FLONASE) 50 MCG/ACT nasal spray 1 spray by Nasal route 2 times daily Yes Gallo Bradford DO   Vitamin D3 125 MCG (5000 UT) TABS tablet Take 1 tablet by mouth daily Yes ProviderMary MD   acetaminophen (TYLENOL) 500 MG tablet Take 1 tablet by mouth every 6 hours as needed for Pain Yes ProviderMary MD   cetirizine (ZYRTEC) 10 MG tablet Take 1 tablet by mouth daily Yes ProviderMary MD   Cyanocobalamin (VITAMIN B-12) 5000 MCG SUBL Place under the tongue Once daily Yes ProviderMary MD   Aspirin Buf,[id number], (BUFFERED ASPIRIN) 325 MG TABS Take 1 tablet by mouth daily Yes Gallo Bradford DO   calcium carbonate (TUMS) 500 MG chewable tablet Take 1 tablet by mouth daily as needed Yes ProviderMary MD   methotrexate (RHEUMATREX) 2.5 MG chemo tablet Take 3 tabs once a week on Tuesday's after supper.  Rajat Ge MD   LORazepam (ATIVAN PO) Take 0.5 mg by mouth as needed  ProviderMary MD   Multiple Vitamins-Minerals (THERAPEUTIC MULTIVITAMIN-MINERALS) tablet Take 1 tablet by mouth daily  Mary Retana MD CareTeam (Including outside providers/suppliers regularly involved in providing care):   Patient Care Team:  None, None as PCP - General  Gallo Bradford DO as PCP - Empaneled Provider      Reviewed and updated this visit:  Allergies  Meds  Med Hx  Surg Hx

## 2024-08-22 DIAGNOSIS — C91.Z0 T-CELL LARGE GRANULAR LYMPHOCYTIC LEUKEMIA (HCC): Primary | ICD-10-CM

## 2024-08-23 ENCOUNTER — OFFICE VISIT (OUTPATIENT)
Dept: ONCOLOGY | Age: 64
End: 2024-08-23
Payer: MEDICARE

## 2024-08-23 ENCOUNTER — HOSPITAL ENCOUNTER (OUTPATIENT)
Dept: LAB | Age: 64
End: 2024-08-23
Payer: MEDICARE

## 2024-08-23 VITALS
HEART RATE: 55 BPM | HEIGHT: 68 IN | OXYGEN SATURATION: 96 % | DIASTOLIC BLOOD PRESSURE: 77 MMHG | SYSTOLIC BLOOD PRESSURE: 145 MMHG | RESPIRATION RATE: 18 BRPM | BODY MASS INDEX: 33.65 KG/M2 | WEIGHT: 222 LBS | TEMPERATURE: 98.2 F

## 2024-08-23 DIAGNOSIS — K29.70 GASTRITIS, PRESENCE OF BLEEDING UNSPECIFIED, UNSPECIFIED CHRONICITY, UNSPECIFIED GASTRITIS TYPE: ICD-10-CM

## 2024-08-23 DIAGNOSIS — C91.Z0 T-CELL LARGE GRANULAR LYMPHOCYTIC LEUKEMIA (HCC): Primary | ICD-10-CM

## 2024-08-23 DIAGNOSIS — I10 HYPERTENSION, UNSPECIFIED TYPE: ICD-10-CM

## 2024-08-23 DIAGNOSIS — C91.Z0 T-CELL LARGE GRANULAR LYMPHOCYTIC LEUKEMIA (HCC): ICD-10-CM

## 2024-08-23 LAB
ALBUMIN SERPL-MCNC: 3.8 G/DL (ref 3.2–4.6)
ALBUMIN/GLOB SERPL: 1.4 (ref 1–1.9)
ALP SERPL-CCNC: 68 U/L (ref 40–129)
ALT SERPL-CCNC: 141 U/L (ref 12–65)
ANION GAP SERPL CALC-SCNC: 8 MMOL/L (ref 9–18)
AST SERPL-CCNC: 81 U/L (ref 15–37)
BASOPHILS # BLD: 0.1 K/UL (ref 0–0.2)
BASOPHILS NFR BLD: 1 % (ref 0–2)
BILIRUB SERPL-MCNC: 0.6 MG/DL (ref 0–1.2)
BUN SERPL-MCNC: 21 MG/DL (ref 8–23)
CALCIUM SERPL-MCNC: 9.5 MG/DL (ref 8.8–10.2)
CHLORIDE SERPL-SCNC: 105 MMOL/L (ref 98–107)
CO2 SERPL-SCNC: 26 MMOL/L (ref 20–28)
CREAT SERPL-MCNC: 1.08 MG/DL (ref 0.8–1.3)
DIFFERENTIAL METHOD BLD: ABNORMAL
EOSINOPHIL # BLD: 0.6 K/UL (ref 0–0.8)
EOSINOPHIL NFR BLD: 8 % (ref 0.5–7.8)
ERYTHROCYTE [DISTWIDTH] IN BLOOD BY AUTOMATED COUNT: 13.3 % (ref 11.9–14.6)
GLOBULIN SER CALC-MCNC: 2.8 G/DL (ref 2.3–3.5)
GLUCOSE SERPL-MCNC: 163 MG/DL (ref 70–99)
HCT VFR BLD AUTO: 44.2 % (ref 41.1–50.3)
HGB BLD-MCNC: 15.1 G/DL (ref 13.6–17.2)
IMM GRANULOCYTES # BLD AUTO: 0 K/UL (ref 0–0.5)
IMM GRANULOCYTES NFR BLD AUTO: 0 % (ref 0–5)
LYMPHOCYTES # BLD: 3.9 K/UL (ref 0.5–4.6)
LYMPHOCYTES NFR BLD: 54 % (ref 13–44)
MCH RBC QN AUTO: 32.7 PG (ref 26.1–32.9)
MCHC RBC AUTO-ENTMCNC: 34.2 G/DL (ref 31.4–35)
MCV RBC AUTO: 95.7 FL (ref 82–102)
MONOCYTES # BLD: 0.4 K/UL (ref 0.1–1.3)
MONOCYTES NFR BLD: 6 % (ref 4–12)
NEUTS SEG # BLD: 2.2 K/UL (ref 1.7–8.2)
NEUTS SEG NFR BLD: 31 % (ref 43–78)
NRBC # BLD: 0 K/UL (ref 0–0.2)
PLATELET # BLD AUTO: 212 K/UL (ref 150–450)
PMV BLD AUTO: 10.7 FL (ref 9.4–12.3)
POTASSIUM SERPL-SCNC: 4.8 MMOL/L (ref 3.5–5.1)
PROT SERPL-MCNC: 6.6 G/DL (ref 6.3–8.2)
RBC # BLD AUTO: 4.62 M/UL (ref 4.23–5.6)
SODIUM SERPL-SCNC: 139 MMOL/L (ref 136–145)
WBC # BLD AUTO: 7.2 K/UL (ref 4.3–11.1)

## 2024-08-23 PROCEDURE — 85025 COMPLETE CBC W/AUTO DIFF WBC: CPT

## 2024-08-23 PROCEDURE — 3077F SYST BP >= 140 MM HG: CPT | Performed by: INTERNAL MEDICINE

## 2024-08-23 PROCEDURE — 3078F DIAST BP <80 MM HG: CPT | Performed by: INTERNAL MEDICINE

## 2024-08-23 PROCEDURE — 80053 COMPREHEN METABOLIC PANEL: CPT

## 2024-08-23 PROCEDURE — 36415 COLL VENOUS BLD VENIPUNCTURE: CPT

## 2024-08-23 PROCEDURE — G8427 DOCREV CUR MEDS BY ELIG CLIN: HCPCS | Performed by: INTERNAL MEDICINE

## 2024-08-23 PROCEDURE — 1036F TOBACCO NON-USER: CPT | Performed by: INTERNAL MEDICINE

## 2024-08-23 PROCEDURE — 3017F COLORECTAL CA SCREEN DOC REV: CPT | Performed by: INTERNAL MEDICINE

## 2024-08-23 PROCEDURE — 99215 OFFICE O/P EST HI 40 MIN: CPT | Performed by: INTERNAL MEDICINE

## 2024-08-23 PROCEDURE — G8417 CALC BMI ABV UP PARAM F/U: HCPCS | Performed by: INTERNAL MEDICINE

## 2024-08-23 NOTE — PATIENT INSTRUCTIONS
Patient Information from Today's Visit    The members of your [facility] are listed below:    Physician Provider: Serg Monteiro Medical Oncologist  Advanced Practice Clinician: Elysia Odell NP  Registered Nurse: N/A  Navigator: N/A  Medical Assistant: Domi NOEL MA and Mirna BEDOYA MA  : Joann NOEL   Supportive Care Services: Karina BOWDEN LMSW    Diagnosis:   T-Cell LGL      Follow Up Instructions:   Lab work looks stable today  We will bring you back in January 2025 for a follow up with  and lab work prior.      Treatment Summary has been discussed and given to patient:N/A      Current Labs:   Hospital Outpatient Visit on 08/23/2024   Component Date Value Ref Range Status    Sodium 08/23/2024 139  136 - 145 mmol/L Final    Potassium 08/23/2024 4.8  3.5 - 5.1 mmol/L Final    Chloride 08/23/2024 105  98 - 107 mmol/L Final    CO2 08/23/2024 26  20 - 28 mmol/L Final    Anion Gap 08/23/2024 8 (L)  9 - 18 mmol/L Final    Glucose 08/23/2024 163 (H)  70 - 99 mg/dL Final    Comment: <70 mg/dL Consistent with, but not fully diagnostic of hypoglycemia.  100 - 125 mg/dL Impaired fasting glucose/consistent with pre-diabetes mellitus.  > 126 mg/dl Fasting glucose consistent with overt diabetes mellitus      BUN 08/23/2024 21  8 - 23 MG/DL Final    Creatinine 08/23/2024 1.08  0.80 - 1.30 MG/DL Final    Est, Glom Filt Rate 08/23/2024 77  >60 ml/min/1.73m2 Final    Comment:    Pediatric calculator link: https://www.kidney.org/professionals/kdoqi/gfr_calculatorped     These results are not intended for use in patients <18 years of age.     eGFR results are calculated without a race factor using  the 2021 CKD-EPI equation. Careful clinical correlation is recommended, particularly when comparing to results calculated using previous equations.  The CKD-EPI equation is less accurate in patients with extremes of muscle mass, extra-renal metabolism of creatinine, excessive creatine ingestion, or following

## 2024-08-23 NOTE — PROGRESS NOTES
were reviewed with the patient today.        ASSESSMENT:     T-Cell LGL; Gastritis; Hypertension. I spent a total of 42 minutes on the day of the visit, managing the care of this patient.        PLAN:   He should continue taking Pantoprazole and Metoprolol; at his next clinic visit I will re-check his CBC and CMP.        Serg Monteiro MD  Hematology/BMT

## 2024-09-05 DIAGNOSIS — M06.9 RHEUMATOID ARTHRITIS INVOLVING MULTIPLE SITES, UNSPECIFIED WHETHER RHEUMATOID FACTOR PRESENT (HCC): ICD-10-CM

## 2024-09-05 DIAGNOSIS — M05.9 RHEUMATOID ARTHRITIS WITH RHEUMATOID FACTOR, UNSPECIFIED (HCC): ICD-10-CM

## 2024-09-05 RX ORDER — DICLOFENAC SODIUM 75 MG/1
75 TABLET, DELAYED RELEASE ORAL 2 TIMES DAILY PRN
Qty: 120 TABLET | Refills: 0 | Status: SHIPPED | OUTPATIENT
Start: 2024-09-05

## 2024-10-05 ENCOUNTER — HOSPITAL ENCOUNTER (OUTPATIENT)
Dept: SLEEP CENTER | Age: 64
Discharge: HOME | End: 2024-10-08
Payer: MEDICARE

## 2024-10-05 PROCEDURE — 95811 POLYSOM 6/>YRS CPAP 4/> PARM: CPT

## 2024-10-14 DIAGNOSIS — M06.1 STILL'S DISEASE OF ADULT (HCC): ICD-10-CM

## 2024-10-14 RX ORDER — ANAKINRA 100 MG/.67ML
100 INJECTION, SOLUTION SUBCUTANEOUS DAILY
Qty: 28 EACH | Refills: 1 | Status: ACTIVE | OUTPATIENT
Start: 2024-10-14 | End: 2024-10-16 | Stop reason: SDUPTHER

## 2024-10-14 NOTE — TELEPHONE ENCOUNTER
Ines at [facility] left vm that pt needs a refill for his Kineret.     I called and spoke with the pt, he does need refill sent in. Next ov is 11/08/24 and his last ov was 07/09/24. Rx is pended and attached to correct pharmacy.

## 2024-10-16 DIAGNOSIS — M06.1 STILL'S DISEASE OF ADULT (HCC): ICD-10-CM

## 2024-10-16 RX ORDER — ANAKINRA 100 MG/.67ML
100 INJECTION, SOLUTION SUBCUTANEOUS DAILY
Qty: 28 EACH | Refills: 1 | Status: ACTIVE | OUTPATIENT
Start: 2024-10-16

## 2024-10-16 NOTE — TELEPHONE ENCOUNTER
Angelika from [facility] left vm that this pt needs a refill for his Kineret syringes sent to them. I put I the incorrect pharmacy in. The correct pharmacy is in now.

## 2024-10-23 DIAGNOSIS — M06.9 RHEUMATOID ARTHRITIS INVOLVING MULTIPLE SITES, UNSPECIFIED WHETHER RHEUMATOID FACTOR PRESENT (HCC): ICD-10-CM

## 2024-10-23 DIAGNOSIS — M05.9 RHEUMATOID ARTHRITIS WITH RHEUMATOID FACTOR, UNSPECIFIED (HCC): ICD-10-CM

## 2024-10-23 DIAGNOSIS — G47.31 CENTRAL SLEEP APNEA: Primary | ICD-10-CM

## 2024-10-23 DIAGNOSIS — K29.60 EROSIVE GASTRITIS: ICD-10-CM

## 2024-10-23 DIAGNOSIS — I10 PRIMARY HYPERTENSION: ICD-10-CM

## 2024-10-23 RX ORDER — DICLOFENAC SODIUM 75 MG/1
75 TABLET, DELAYED RELEASE ORAL 2 TIMES DAILY PRN
Qty: 120 TABLET | Refills: 0 | Status: CANCELLED | OUTPATIENT
Start: 2024-10-23

## 2024-10-23 NOTE — PROGRESS NOTES
Patient had recent sleep study that showed sleep apnea and hypoxemia. AHI- 79     Lowest SaO2- 68%. Per Interp patient will need a cpap titration. Sleep lab will call the patient to schedule titration or the patient can call the sleep lab at [phone] to schedule.

## 2024-10-24 RX ORDER — PANTOPRAZOLE SODIUM 40 MG/1
40 TABLET, DELAYED RELEASE ORAL
Qty: 90 TABLET | Refills: 1 | Status: SHIPPED | OUTPATIENT
Start: 2024-10-24

## 2024-10-24 RX ORDER — DICLOFENAC SODIUM 75 MG/1
75 TABLET, DELAYED RELEASE ORAL 2 TIMES DAILY PRN
Qty: 120 TABLET | Refills: 0 | Status: SHIPPED | OUTPATIENT
Start: 2024-10-24

## 2024-10-24 RX ORDER — METOPROLOL TARTRATE 50 MG
50 TABLET ORAL 2 TIMES DAILY
Qty: 180 TABLET | Refills: 1 | Status: SHIPPED | OUTPATIENT
Start: 2024-10-24

## 2024-10-30 ENCOUNTER — HOSPITAL ENCOUNTER (OUTPATIENT)
Dept: SLEEP CENTER | Age: 64
Discharge: HOME | End: 2024-11-02
Payer: MEDICARE

## 2024-10-30 PROCEDURE — 95811 POLYSOM 6/>YRS CPAP 4/> PARM: CPT

## 2024-11-07 ENCOUNTER — TELEPHONE (OUTPATIENT)
Dept: SLEEP MEDICINE | Age: 64
End: 2024-11-07

## 2024-11-07 NOTE — PROGRESS NOTES
[facility]  [address]  [place] [address]  [phone]    Patient Name:  Adilson Cloud  YOB: 1960      Office Visit 11/8/2024    CHIEF COMPLAINT:    Chief Complaint   Patient presents with    New Patient    Sleep Study    Results    Sleep Apnea    CPAP/BiPAP       HISTORY OF PRESENT ILLNESS:      The patient present in outpatient consultation at the request of Dr. Arturo Cerrato for evaluation and management of sleep apnea.    The patient underwent a recent diagnostic polysomnography because of symptoms including snoring, witnessed apneas, difficulty falling asleep.  There  is no history of cataplexy, hypnagogic hallucinations, or sleep paralysis.  In addition, there is no history of frequent leg movements, kicking at night, or an inability to keep the legs still.  He go to sleep around 10:30 PM and wake up around 7 AM.  It takes him at least 1 hour or more to fall asleep.  He has multiple awakening every night.  He does not feel refreshed upon awakening in the morning.  He is retired since 2012.  He used to work for the [employer].  He met the criteria for a split-night study.     The diagnostic polysomnography was notable for a respiratory disturbance index of 79.1/hour.  Oxygen desaturations are low as 68% were noted.  Significant cardiac arrhythmias were not evident.  The patient was noted to have minimal limb movements with a limb movement arousal index being about 1.0/hour.  A subsequent CPAP titration study was conducted.  PAP levels as high as 20/16 cm were performed.  CPAP was not effective in eliminating disordered breathing.  The patient underwent BiPAP ST titration and he needed BiPAP at 20/16 cm with a rate of 12/min and had oxygen added to correct his residual hypoxemia..  BiPAP was tolerated okay by the patient.  The patient reports feeling about the same the day following    I reviewed the study with him in great detail

## 2024-11-08 ENCOUNTER — OFFICE VISIT (OUTPATIENT)
Dept: SLEEP MEDICINE | Age: 64
End: 2024-11-08

## 2024-11-08 VITALS
SYSTOLIC BLOOD PRESSURE: 142 MMHG | HEART RATE: 51 BPM | RESPIRATION RATE: 14 BRPM | WEIGHT: 225 LBS | OXYGEN SATURATION: 94 % | DIASTOLIC BLOOD PRESSURE: 80 MMHG | BODY MASS INDEX: 34.1 KG/M2 | HEIGHT: 68 IN

## 2024-11-08 DIAGNOSIS — G47.33 OSA (OBSTRUCTIVE SLEEP APNEA): Primary | ICD-10-CM

## 2024-11-08 DIAGNOSIS — G47.19 EXCESSIVE DAYTIME SLEEPINESS: ICD-10-CM

## 2024-11-08 DIAGNOSIS — G47.37 CENTRAL SLEEP APNEA IN CONDITIONS CLASSIFIED ELSEWHERE: ICD-10-CM

## 2024-11-08 DIAGNOSIS — G25.81 RESTLESS LEG SYNDROME: ICD-10-CM

## 2024-11-08 DIAGNOSIS — G47.34 NOCTURNAL HYPOXEMIA: ICD-10-CM

## 2024-11-08 NOTE — PATIENT INSTRUCTIONS
failure.  How does it help?  CPAP can help you have more normal sleep, so you feel less sleepy and more alert during the daytime.  CPAP may help keep heart failure or other heart problems from getting worse.  CPAP may help lower your blood pressure.  If you use CPAP, your bed partner may also sleep better because you are not snoring or restless.  What are the side effects?  Some people who use CPAP have:  A dry or stuffy nose and a sore throat.  Irritated skin on the face.  Sore eyes.  Bloating.  If you have any of these problems, work with your doctor to fix them. Here are some things you can try:  Be sure the mask or nasal prongs fit well.  See if your doctor can adjust the pressure of your CPAP.  If your nose is dry, try a humidifier.  If your nose is runny or stuffy, try decongestant medicine or a steroid nasal spray. Be safe with medicines. Read and follow all instructions on the label. Do not use the medicine longer than the label says.  If these things do not help, you might try a different type of machine. Some machines have air pressure that adjusts on its own. Others have air pressures that are different when you breathe in than when you breathe out. This may reduce discomfort caused by too much pressure in your nose.   Where can you learn more?   Go to http://www.[employer].net/[facility]  Enter X266 in the search box to learn more about \"Learning About CPAP for Sleep Apnea.\"   © 0762-2230 [employer]. Care instructions adapted under license by [employer] (which disclaims liability or warranty for this information). This care instruction is for use with your licensed healthcare professional. If you have questions about a medical condition or this instruction, always ask your healthcare professional. [employer] disclaims any warranty or liability for your use of this information.  Content Version: 10.1.407776; Current as of: January 24, 2014

## 2024-11-14 ENCOUNTER — LAB (OUTPATIENT)
Dept: UROLOGY | Age: 64
End: 2024-11-14

## 2024-11-14 DIAGNOSIS — R97.20 ELEVATED PSA: ICD-10-CM

## 2024-11-14 LAB — PSA SERPL-MCNC: 2.5 NG/ML (ref 0–4)

## 2024-11-21 ENCOUNTER — OFFICE VISIT (OUTPATIENT)
Dept: UROLOGY | Age: 64
End: 2024-11-21
Payer: MEDICARE

## 2024-11-21 DIAGNOSIS — N40.0 BPH WITHOUT OBSTRUCTION/LOWER URINARY TRACT SYMPTOMS: Primary | ICD-10-CM

## 2024-11-21 LAB
BILIRUBIN, URINE, POC: NEGATIVE
BLOOD URINE, POC: NORMAL
GLUCOSE URINE, POC: NEGATIVE MG/DL
KETONES, URINE, POC: NEGATIVE MG/DL
LEUKOCYTE ESTERASE, URINE, POC: NEGATIVE
NITRITE, URINE, POC: NEGATIVE
PH, URINE, POC: 5.5 (ref 4.6–8)
PROTEIN,URINE, POC: NEGATIVE MG/DL
SPECIFIC GRAVITY, URINE, POC: 1.02 (ref 1–1.03)
URINALYSIS CLARITY, POC: NORMAL
URINALYSIS COLOR, POC: NORMAL
UROBILINOGEN, POC: NORMAL MG/DL

## 2024-11-21 PROCEDURE — G8417 CALC BMI ABV UP PARAM F/U: HCPCS | Performed by: NURSE PRACTITIONER

## 2024-11-21 PROCEDURE — 99213 OFFICE O/P EST LOW 20 MIN: CPT | Performed by: NURSE PRACTITIONER

## 2024-11-21 PROCEDURE — 1036F TOBACCO NON-USER: CPT | Performed by: NURSE PRACTITIONER

## 2024-11-21 PROCEDURE — G8484 FLU IMMUNIZE NO ADMIN: HCPCS | Performed by: NURSE PRACTITIONER

## 2024-11-21 PROCEDURE — 3017F COLORECTAL CA SCREEN DOC REV: CPT | Performed by: NURSE PRACTITIONER

## 2024-11-21 PROCEDURE — 81003 URINALYSIS AUTO W/O SCOPE: CPT | Performed by: NURSE PRACTITIONER

## 2024-11-21 PROCEDURE — G8427 DOCREV CUR MEDS BY ELIG CLIN: HCPCS | Performed by: NURSE PRACTITIONER

## 2024-11-21 ASSESSMENT — ENCOUNTER SYMPTOMS: BACK PAIN: 0

## 2024-11-22 ENCOUNTER — OFFICE VISIT (OUTPATIENT)
Dept: INTERNAL MEDICINE CLINIC | Facility: CLINIC | Age: 64
End: 2024-11-22

## 2024-11-22 VITALS
DIASTOLIC BLOOD PRESSURE: 88 MMHG | OXYGEN SATURATION: 97 % | WEIGHT: 223 LBS | SYSTOLIC BLOOD PRESSURE: 140 MMHG | HEART RATE: 50 BPM | BODY MASS INDEX: 33.8 KG/M2 | HEIGHT: 68 IN

## 2024-11-22 DIAGNOSIS — G47.33 OSA (OBSTRUCTIVE SLEEP APNEA): ICD-10-CM

## 2024-11-22 DIAGNOSIS — K21.9 GASTROESOPHAGEAL REFLUX DISEASE WITHOUT ESOPHAGITIS: ICD-10-CM

## 2024-11-22 DIAGNOSIS — K29.60 EROSIVE GASTRITIS: ICD-10-CM

## 2024-11-22 DIAGNOSIS — F41.1 GENERALIZED ANXIETY DISORDER: ICD-10-CM

## 2024-11-22 DIAGNOSIS — I10 PRIMARY HYPERTENSION: ICD-10-CM

## 2024-11-22 DIAGNOSIS — J30.2 SEASONAL ALLERGIES: ICD-10-CM

## 2024-11-22 DIAGNOSIS — E78.2 MIXED HYPERLIPIDEMIA: ICD-10-CM

## 2024-11-22 DIAGNOSIS — Z79.899 OTHER LONG TERM (CURRENT) DRUG THERAPY: ICD-10-CM

## 2024-11-22 DIAGNOSIS — M06.1 STILL'S DISEASE OF ADULT (HCC): ICD-10-CM

## 2024-11-22 DIAGNOSIS — G25.81 RESTLESS LEG SYNDROME: ICD-10-CM

## 2024-11-22 DIAGNOSIS — M06.9 RHEUMATOID ARTHRITIS INVOLVING MULTIPLE SITES, UNSPECIFIED WHETHER RHEUMATOID FACTOR PRESENT (HCC): ICD-10-CM

## 2024-11-22 DIAGNOSIS — C91.Z0 T-CELL LARGE GRANULAR LYMPHOCYTIC LEUKEMIA (HCC): ICD-10-CM

## 2024-11-22 DIAGNOSIS — M06.1 ADULT-ONSET STILL'S DISEASE (HCC): ICD-10-CM

## 2024-11-22 DIAGNOSIS — G47.9 SLEEP DISTURBANCE: ICD-10-CM

## 2024-11-22 DIAGNOSIS — I10 PRIMARY HYPERTENSION: Primary | ICD-10-CM

## 2024-11-22 DIAGNOSIS — M05.9 RHEUMATOID ARTHRITIS WITH RHEUMATOID FACTOR, UNSPECIFIED (HCC): ICD-10-CM

## 2024-11-22 PROBLEM — G47.19 EXCESSIVE DAYTIME SLEEPINESS: Status: RESOLVED | Noted: 2024-08-02 | Resolved: 2024-11-22

## 2024-11-22 LAB
ANION GAP SERPL CALC-SCNC: 7 MMOL/L (ref 7–16)
BASOPHILS # BLD: 0.1 K/UL (ref 0–0.2)
BASOPHILS NFR BLD: 1 % (ref 0–2)
BUN SERPL-MCNC: 18 MG/DL (ref 8–23)
CALCIUM SERPL-MCNC: 8.9 MG/DL (ref 8.8–10.2)
CHLORIDE SERPL-SCNC: 106 MMOL/L (ref 98–107)
CO2 SERPL-SCNC: 27 MMOL/L (ref 20–29)
CREAT SERPL-MCNC: 1.13 MG/DL (ref 0.8–1.3)
DIFFERENTIAL METHOD BLD: ABNORMAL
EOSINOPHIL # BLD: 0.6 K/UL (ref 0–0.8)
EOSINOPHIL NFR BLD: 8 % (ref 0.5–7.8)
ERYTHROCYTE [DISTWIDTH] IN BLOOD BY AUTOMATED COUNT: 13.2 % (ref 11.9–14.6)
GLUCOSE SERPL-MCNC: 104 MG/DL (ref 70–99)
HCT VFR BLD AUTO: 44.7 % (ref 41.1–50.3)
HGB BLD-MCNC: 15.2 G/DL (ref 13.6–17.2)
IMM GRANULOCYTES # BLD AUTO: 0 K/UL (ref 0–0.5)
IMM GRANULOCYTES NFR BLD AUTO: 1 % (ref 0–5)
LYMPHOCYTES # BLD: 3.8 K/UL (ref 0.5–4.6)
LYMPHOCYTES NFR BLD: 54 % (ref 13–44)
MCH RBC QN AUTO: 32.8 PG (ref 26.1–32.9)
MCHC RBC AUTO-ENTMCNC: 34 G/DL (ref 31.4–35)
MCV RBC AUTO: 96.5 FL (ref 82–102)
MONOCYTES # BLD: 0.6 K/UL (ref 0.1–1.3)
MONOCYTES NFR BLD: 9 % (ref 4–12)
NEUTS SEG # BLD: 1.9 K/UL (ref 1.7–8.2)
NEUTS SEG NFR BLD: 27 % (ref 43–78)
NRBC # BLD: 0 K/UL (ref 0–0.2)
PLATELET # BLD AUTO: 205 K/UL (ref 150–450)
PMV BLD AUTO: 11.3 FL (ref 9.4–12.3)
POTASSIUM SERPL-SCNC: 4.5 MMOL/L (ref 3.5–5.1)
RBC # BLD AUTO: 4.63 M/UL (ref 4.23–5.6)
SODIUM SERPL-SCNC: 139 MMOL/L (ref 136–145)
WBC # BLD AUTO: 7 K/UL (ref 4.3–11.1)

## 2024-11-22 RX ORDER — PANTOPRAZOLE SODIUM 40 MG/1
40 TABLET, DELAYED RELEASE ORAL
Qty: 90 TABLET | Refills: 1 | Status: SHIPPED | OUTPATIENT
Start: 2024-11-22 | End: 2025-05-21

## 2024-11-22 RX ORDER — METHOTREXATE 2.5 MG/1
7.5 TABLET ORAL WEEKLY
Qty: 36 TABLET | Refills: 0 | Status: SHIPPED | OUTPATIENT
Start: 2024-11-22 | End: 2025-01-10

## 2024-11-22 RX ORDER — METOPROLOL TARTRATE 50 MG
50 TABLET ORAL 2 TIMES DAILY
Qty: 180 TABLET | Refills: 1 | Status: SHIPPED | OUTPATIENT
Start: 2024-11-22 | End: 2025-05-21

## 2024-11-22 RX ORDER — DICLOFENAC SODIUM 75 MG/1
75 TABLET, DELAYED RELEASE ORAL 2 TIMES DAILY PRN
Qty: 180 TABLET | Refills: 1 | Status: SHIPPED | OUTPATIENT
Start: 2024-11-22

## 2024-11-22 RX ORDER — TRAZODONE HYDROCHLORIDE 50 MG/1
50 TABLET, FILM COATED ORAL NIGHTLY
Qty: 90 TABLET | Refills: 1 | Status: SHIPPED | OUTPATIENT
Start: 2024-11-22 | End: 2025-05-21

## 2024-11-22 SDOH — ECONOMIC STABILITY: FOOD INSECURITY: WITHIN THE PAST 12 MONTHS, YOU WORRIED THAT YOUR FOOD WOULD RUN OUT BEFORE YOU GOT MONEY TO BUY MORE.: NEVER TRUE

## 2024-11-22 SDOH — ECONOMIC STABILITY: FOOD INSECURITY: WITHIN THE PAST 12 MONTHS, THE FOOD YOU BOUGHT JUST DIDN'T LAST AND YOU DIDN'T HAVE MONEY TO GET MORE.: NEVER TRUE

## 2024-11-22 SDOH — ECONOMIC STABILITY: INCOME INSECURITY: HOW HARD IS IT FOR YOU TO PAY FOR THE VERY BASICS LIKE FOOD, HOUSING, MEDICAL CARE, AND HEATING?: NOT HARD AT ALL

## 2024-11-22 ASSESSMENT — PATIENT HEALTH QUESTIONNAIRE - PHQ9
SUM OF ALL RESPONSES TO PHQ9 QUESTIONS 1 & 2: 0
SUM OF ALL RESPONSES TO PHQ QUESTIONS 1-9: 0
2. FEELING DOWN, DEPRESSED OR HOPELESS: NOT AT ALL
SUM OF ALL RESPONSES TO PHQ QUESTIONS 1-9: 0
1. LITTLE INTEREST OR PLEASURE IN DOING THINGS: NOT AT ALL
SUM OF ALL RESPONSES TO PHQ QUESTIONS 1-9: 0
SUM OF ALL RESPONSES TO PHQ QUESTIONS 1-9: 0

## 2024-11-22 NOTE — PROGRESS NOTES
-Patient will continue monitoring his blood pressure at home.     GERD  -Protonix 40 mg daily, this helps some.      Mixed hyperlipidemia  -Lipid panel from 8/3/2023 showed total cholesterol 193, HDL 48, LDL of 117, triglycerides 140, VLDL of 28  -Continue diet and exercise     Adult onset stills disease  -Anakinra 100 mg / 0.67 mL SOSY injection, inject 0.67 mL daily  -Methotrexate 2.5 mg, 3 tabs a week on Tuesdays  -Folvite 1 mg daily  -Patient following with rheumatology, last seen on 7/9/2024  -Patient will follow-up with rheumatology 1/10/2025     T-cell large granular lymphocytic leukemia  -Diagnosed October 2023  -Patient following with hematology/oncology, last seen 8/23/2024.  Plan at this time is to continue taking his pantoprazole and metoprolol and he will follow-up with hematology/oncology 1/24/2025     Hepatic Steatosis   -Abdominal Ultrasound from 06/09/2023 showed hepatic steatosis with borderline hepatomegaly.  -Liver panel from 7/9/2024 showed an ALT of 105 and an AST of 55  -Continue diet and exercise     Generalized anxiety disorder with sleep disturbance   -Anxiety is stable.  -Ativan 0.5 mg daily as needed  -Trazodone 50 mg nightly     Obstructive sleep apnea  -STOP-BANG score of 6  -Sleep study on 11/8/2024 showed severe sleep apnea with an AHI of 79.1  -Patient had follow-up with sleep medicine on 11/8/2024, and plan at this time was to start BiPAP ST at 20/16 cmH2O with backup rate 12/min with added oxygen at 2.5 L/min.  Patient will do an overnight oximetry on the settings in December and follow-up with sleep medicine in 4 months.  -Patient has follow-up with sleep medicine 4/14/2025    BPH without obstruction/lower urinary tract symptoms  -PSA on 11/14/2024 was 2.5  -Patient had follow-up with urology on 11/21/2024, PSA stable at this time with no LUTS.  Plan at this time was to follow-up with a yearly PSA with PCP and return if abnormal.     Seasonal and environmental allergies, well

## 2025-01-10 ENCOUNTER — TELEMEDICINE (OUTPATIENT)
Dept: RHEUMATOLOGY | Age: 65
End: 2025-01-10

## 2025-01-10 DIAGNOSIS — Z79.899 LONG-TERM USE OF HIGH-RISK MEDICATION: ICD-10-CM

## 2025-01-10 DIAGNOSIS — M06.1 STILL'S DISEASE OF ADULT (HCC): Primary | ICD-10-CM

## 2025-01-10 DIAGNOSIS — Z11.1 SCREENING EXAMINATION FOR PULMONARY TUBERCULOSIS: ICD-10-CM

## 2025-01-10 RX ORDER — ANAKINRA 100 MG/.67ML
100 INJECTION, SOLUTION SUBCUTANEOUS DAILY
Qty: 28 EACH | Refills: 3 | Status: ACTIVE | OUTPATIENT
Start: 2025-01-10

## 2025-01-10 NOTE — PROGRESS NOTES
11/22/2024 3.8  0.5 - 4.6 K/UL Final    Monocytes Absolute 11/22/2024 0.6  0.1 - 1.3 K/UL Final    Eosinophils Absolute 11/22/2024 0.6  0.0 - 0.8 K/UL Final    Basophils Absolute 11/22/2024 0.1  0.0 - 0.2 K/UL Final    Immature Granulocytes Absolute 11/22/2024 0.0  0.0 - 0.5 K/UL Final    Sodium 11/22/2024 139  136 - 145 mmol/L Final    Potassium 11/22/2024 4.5  3.5 - 5.1 mmol/L Final    Chloride 11/22/2024 106  98 - 107 mmol/L Final    CO2 11/22/2024 27  20 - 29 mmol/L Final    Anion Gap 11/22/2024 7  7 - 16 mmol/L Final    Glucose 11/22/2024 104 (H)  70 - 99 mg/dL Final    Comment: <70 mg/dL Consistent with, but not fully diagnostic of hypoglycemia.  100 - 125 mg/dL Impaired fasting glucose/consistent with pre-diabetes mellitus.  > 126 mg/dl Fasting glucose consistent with overt diabetes mellitus      BUN 11/22/2024 18  8 - 23 MG/DL Final    Creatinine 11/22/2024 1.13  0.80 - 1.30 MG/DL Final    Est, Glom Filt Rate 11/22/2024 73  >60 ml/min/1.73m2 Final    Comment:   Pediatric calculator link: https://www.kidney.org/professionals/kdoqi/gfr_calculatorped    These results are not intended for use in patients <18 years of age.    eGFR results are calculated without a race factor using  the 2021 CKD-EPI equation. Careful clinical correlation is recommended, particularly when comparing to results calculated using previous equations.  The CKD-EPI equation is less accurate in patients with extremes of muscle mass, extra-renal metabolism of creatinine, excessive creatine ingestion, or following therapy that affects renal tubular secretion.      Calcium 11/22/2024 8.9  8.8 - 10.2 MG/DL Final   Office Visit on 11/21/2024   Component Date Value Ref Range Status    Glucose, Urine, POC 11/21/2024 Negative  Negative mg/dL Final    Bilirubin, Urine, POC 11/21/2024 Negative  Negative Final    KETONES, Urine, POC 11/21/2024 Negative  Negative mg/dL Final    Specific Gravity, Urine, POC 11/21/2024 1.020  1.001 - 1.035 Final

## 2025-01-21 DIAGNOSIS — D75.839 THROMBOCYTOSIS: ICD-10-CM

## 2025-01-21 DIAGNOSIS — D72.829 LEUKOCYTOSIS, UNSPECIFIED TYPE: ICD-10-CM

## 2025-01-21 DIAGNOSIS — R79.89 ELEVATED FERRITIN: ICD-10-CM

## 2025-01-21 DIAGNOSIS — C91.Z0 T-CELL LARGE GRANULAR LYMPHOCYTIC LEUKEMIA (HCC): Primary | ICD-10-CM

## 2025-01-21 DIAGNOSIS — D47.1 MPN (MYELOPROLIFERATIVE NEOPLASM) (HCC): ICD-10-CM

## 2025-01-23 ENCOUNTER — TELEPHONE (OUTPATIENT)
Dept: RHEUMATOLOGY | Age: 65
End: 2025-01-23

## 2025-01-23 NOTE — TELEPHONE ENCOUNTER
----- Message from ALLEY HERBERT MA sent at 1/10/2025 10:56 AM EST -----  Regarding: kineret  Rajat Ge MD Lipscomb, Shara Y, MA  Needs new PA on Kineret as patient states [facility] reached out to him to get a new PA.  Treatment of Kineret delayed because of airports being closed.  Do we have samples of Kineret in our office refrigerator?  If so please call the patient to come and  some samples. Thanks

## 2025-01-23 NOTE — TELEPHONE ENCOUNTER
PA for Kineret 100 mg/0.67 ml syringe needs to be renewed. [facility].   Notes from Kai ANDERSON approved through 12/31/25. Rx was routed to [facility] by [facility].

## 2025-01-24 ENCOUNTER — HOSPITAL ENCOUNTER (OUTPATIENT)
Dept: LAB | Age: 65
Discharge: HOME | End: 2025-01-24
Payer: MEDICARE

## 2025-01-24 ENCOUNTER — OFFICE VISIT (OUTPATIENT)
Dept: ONCOLOGY | Age: 65
End: 2025-01-24
Payer: MEDICARE

## 2025-01-24 VITALS
OXYGEN SATURATION: 94 % | DIASTOLIC BLOOD PRESSURE: 80 MMHG | WEIGHT: 226.4 LBS | BODY MASS INDEX: 34.31 KG/M2 | TEMPERATURE: 98 F | RESPIRATION RATE: 16 BRPM | HEIGHT: 68 IN | SYSTOLIC BLOOD PRESSURE: 154 MMHG | HEART RATE: 53 BPM

## 2025-01-24 DIAGNOSIS — R79.89 ELEVATED FERRITIN: ICD-10-CM

## 2025-01-24 DIAGNOSIS — C91.Z0 T-CELL LARGE GRANULAR LYMPHOCYTIC LEUKEMIA (HCC): ICD-10-CM

## 2025-01-24 DIAGNOSIS — C91.Z0 T-CELL LARGE GRANULAR LYMPHOCYTIC LEUKEMIA (HCC): Primary | ICD-10-CM

## 2025-01-24 DIAGNOSIS — D47.1 MPN (MYELOPROLIFERATIVE NEOPLASM) (HCC): ICD-10-CM

## 2025-01-24 DIAGNOSIS — Z11.1 SCREENING EXAMINATION FOR PULMONARY TUBERCULOSIS: ICD-10-CM

## 2025-01-24 DIAGNOSIS — I10 HYPERTENSION, UNSPECIFIED TYPE: ICD-10-CM

## 2025-01-24 DIAGNOSIS — D75.839 THROMBOCYTOSIS: ICD-10-CM

## 2025-01-24 DIAGNOSIS — Z79.899 LONG-TERM USE OF HIGH-RISK MEDICATION: ICD-10-CM

## 2025-01-24 DIAGNOSIS — M06.1 STILL'S DISEASE OF ADULT (HCC): ICD-10-CM

## 2025-01-24 DIAGNOSIS — D72.829 LEUKOCYTOSIS, UNSPECIFIED TYPE: ICD-10-CM

## 2025-01-24 DIAGNOSIS — K29.70 GASTRITIS, PRESENCE OF BLEEDING UNSPECIFIED, UNSPECIFIED CHRONICITY, UNSPECIFIED GASTRITIS TYPE: ICD-10-CM

## 2025-01-24 LAB
ALBUMIN SERPL-MCNC: 4 G/DL (ref 3.2–4.6)
ALBUMIN SERPL-MCNC: 4.1 G/DL (ref 3.2–4.6)
ALBUMIN/GLOB SERPL: 1.3 (ref 1–1.9)
ALBUMIN/GLOB SERPL: 1.5 (ref 1–1.9)
ALP SERPL-CCNC: 57 U/L (ref 40–129)
ALP SERPL-CCNC: 60 U/L (ref 40–129)
ALT SERPL-CCNC: 85 U/L (ref 8–55)
ALT SERPL-CCNC: 87 U/L (ref 8–55)
ANION GAP SERPL CALC-SCNC: 10 MMOL/L (ref 7–16)
ANION GAP SERPL CALC-SCNC: 9 MMOL/L (ref 7–16)
AST SERPL-CCNC: 47 U/L (ref 15–37)
AST SERPL-CCNC: 47 U/L (ref 15–37)
BASOPHILS # BLD: 0.08 K/UL (ref 0–0.2)
BASOPHILS NFR BLD: 1.1 % (ref 0–2)
BILIRUB SERPL-MCNC: 0.7 MG/DL (ref 0–1.2)
BILIRUB SERPL-MCNC: 0.7 MG/DL (ref 0–1.2)
BUN SERPL-MCNC: 22 MG/DL (ref 8–23)
BUN SERPL-MCNC: 23 MG/DL (ref 8–23)
CALCIUM SERPL-MCNC: 9.5 MG/DL (ref 8.8–10.2)
CALCIUM SERPL-MCNC: 9.7 MG/DL (ref 8.8–10.2)
CHLORIDE SERPL-SCNC: 105 MMOL/L (ref 98–107)
CHLORIDE SERPL-SCNC: 106 MMOL/L (ref 98–107)
CO2 SERPL-SCNC: 25 MMOL/L (ref 20–29)
CO2 SERPL-SCNC: 26 MMOL/L (ref 20–29)
CREAT SERPL-MCNC: 1.09 MG/DL (ref 0.8–1.3)
CREAT SERPL-MCNC: 1.16 MG/DL (ref 0.8–1.3)
CRP SERPL-MCNC: <0.3 MG/DL (ref 0–0.4)
DIFFERENTIAL METHOD BLD: ABNORMAL
EOSINOPHIL # BLD: 0.47 K/UL (ref 0–0.8)
EOSINOPHIL NFR BLD: 6.2 % (ref 0.5–7.8)
ERYTHROCYTE [DISTWIDTH] IN BLOOD BY AUTOMATED COUNT: 13.1 % (ref 11.9–14.6)
GLOBULIN SER CALC-MCNC: 2.8 G/DL (ref 2.3–3.5)
GLOBULIN SER CALC-MCNC: 3 G/DL (ref 2.3–3.5)
GLUCOSE SERPL-MCNC: 103 MG/DL (ref 70–99)
GLUCOSE SERPL-MCNC: 104 MG/DL (ref 70–99)
HCT VFR BLD AUTO: 44.9 % (ref 41.1–50.3)
HGB BLD-MCNC: 15.3 G/DL (ref 13.6–17.2)
IMM GRANULOCYTES # BLD AUTO: 0.02 K/UL (ref 0–0.5)
IMM GRANULOCYTES NFR BLD AUTO: 0.3 % (ref 0–5)
LYMPHOCYTES # BLD: 3.99 K/UL (ref 0.5–4.6)
LYMPHOCYTES NFR BLD: 52.9 % (ref 13–44)
MCH RBC QN AUTO: 32.2 PG (ref 26.1–32.9)
MCHC RBC AUTO-ENTMCNC: 34.1 G/DL (ref 31.4–35)
MCV RBC AUTO: 94.5 FL (ref 82–102)
MONOCYTES # BLD: 0.59 K/UL (ref 0.1–1.3)
MONOCYTES NFR BLD: 7.8 % (ref 4–12)
NEUTS SEG # BLD: 2.39 K/UL (ref 1.7–8.2)
NEUTS SEG NFR BLD: 31.7 % (ref 43–78)
NRBC # BLD: 0 K/UL (ref 0–0.2)
PLATELET # BLD AUTO: 196 K/UL (ref 150–450)
PMV BLD AUTO: 10.8 FL (ref 9.4–12.3)
POTASSIUM SERPL-SCNC: 4.5 MMOL/L (ref 3.5–5.1)
POTASSIUM SERPL-SCNC: 4.8 MMOL/L (ref 3.5–5.1)
PROT SERPL-MCNC: 6.9 G/DL (ref 6.3–8.2)
PROT SERPL-MCNC: 7 G/DL (ref 6.3–8.2)
RBC # BLD AUTO: 4.75 M/UL (ref 4.23–5.6)
SODIUM SERPL-SCNC: 141 MMOL/L (ref 136–145)
SODIUM SERPL-SCNC: 141 MMOL/L (ref 136–145)
WBC # BLD AUTO: 7.5 K/UL (ref 4.3–11.1)

## 2025-01-24 PROCEDURE — 3017F COLORECTAL CA SCREEN DOC REV: CPT | Performed by: INTERNAL MEDICINE

## 2025-01-24 PROCEDURE — 3079F DIAST BP 80-89 MM HG: CPT | Performed by: INTERNAL MEDICINE

## 2025-01-24 PROCEDURE — G8417 CALC BMI ABV UP PARAM F/U: HCPCS | Performed by: INTERNAL MEDICINE

## 2025-01-24 PROCEDURE — 1036F TOBACCO NON-USER: CPT | Performed by: INTERNAL MEDICINE

## 2025-01-24 PROCEDURE — G8427 DOCREV CUR MEDS BY ELIG CLIN: HCPCS | Performed by: INTERNAL MEDICINE

## 2025-01-24 PROCEDURE — 85025 COMPLETE CBC W/AUTO DIFF WBC: CPT

## 2025-01-24 PROCEDURE — 36415 COLL VENOUS BLD VENIPUNCTURE: CPT

## 2025-01-24 PROCEDURE — 3077F SYST BP >= 140 MM HG: CPT | Performed by: INTERNAL MEDICINE

## 2025-01-24 PROCEDURE — 99215 OFFICE O/P EST HI 40 MIN: CPT | Performed by: INTERNAL MEDICINE

## 2025-01-24 PROCEDURE — 80053 COMPREHEN METABOLIC PANEL: CPT

## 2025-01-24 NOTE — PATIENT INSTRUCTIONS
Patient Information from Today's Visit    The members of your Oncology Medical Home are listed below:    Physician Provider: Serg Monteiro Medical Oncologist  Advanced Practice Clinician: Elysia Odell NP  Registered Nurse: N/A  Navigator: N/A  Medical Assistant: Domi NOEL MA and Mirna BEDOYA MA  : Tashia VELA   Supportive Care Services: Karina BOWDEN LMSW    Diagnosis: T-cell LGL      Follow Up Instructions:   - labs reviewed  - follow up in 6 months      Treatment Summary has been discussed and given to patient:No      Current Labs:   Hospital Outpatient Visit on 01/24/2025   Component Date Value Ref Range Status    WBC 01/24/2025 7.5  4.3 - 11.1 K/uL Final    RBC 01/24/2025 4.75  4.23 - 5.6 M/uL Final    Hemoglobin 01/24/2025 15.3  13.6 - 17.2 g/dL Final    Hematocrit 01/24/2025 44.9  41.1 - 50.3 % Final    MCV 01/24/2025 94.5  82.0 - 102.0 FL Final    MCH 01/24/2025 32.2  26.1 - 32.9 PG Final    MCHC 01/24/2025 34.1  31.4 - 35.0 g/dL Final    RDW 01/24/2025 13.1  11.9 - 14.6 % Final    Platelets 01/24/2025 196  150 - 450 K/uL Final    MPV 01/24/2025 10.8  9.4 - 12.3 FL Final    nRBC 01/24/2025 0.00  0.0 - 0.2 K/uL Final    **Note: Absolute NRBC parameter is now reported with Hemogram**    Neutrophils % 01/24/2025 31.7 (L)  43.0 - 78.0 % Final    Lymphocytes % 01/24/2025 52.9 (H)  13.0 - 44.0 % Final    Monocytes % 01/24/2025 7.8  4.0 - 12.0 % Final    Eosinophils % 01/24/2025 6.2  0.5 - 7.8 % Final    Basophils % 01/24/2025 1.1  0.0 - 2.0 % Final    Immature Granulocytes % 01/24/2025 0.3  0.0 - 5.0 % Final    Neutrophils Absolute 01/24/2025 2.39  1.70 - 8.20 K/UL Final    Lymphocytes Absolute 01/24/2025 3.99  0.50 - 4.60 K/UL Final    Monocytes Absolute 01/24/2025 0.59  0.10 - 1.30 K/UL Final    Eosinophils Absolute 01/24/2025 0.47  0.00 - 0.80 K/UL Final    Basophils Absolute 01/24/2025 0.08  0.00 - 0.20 K/UL Final    Immature Granulocytes Absolute 01/24/2025 0.02  0.00 - 0.50 K/UL Final    Differential

## 2025-01-24 NOTE — PROGRESS NOTES
[address] [address]  Phone: [phone]           1/24/2025  Adilson Cloud  1960  [id number]        Adilson Cloud is a 64 year old  man who has returned to my clinic for a follow-up visit; he was initially referred to me by Dr. Promise Johnston for evaluation of Leukocytosis and Thrombocytosis, his inflammatory markers were elevated, he does not have Iron overload or Hereditary Hemochromatosis. 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        ALLERGIES:    No known drug allergies.        FAMILY HISTORY:     No hematologic disorders.        SOCIAL HISTORY:     He is  and lives with his wife. He used to work as a . He denies ever using any tobacco products.        PAST MEDICAL HISTORY:     Anxiety Disorder, Colon Cancer, Hypertension, Gastritis, Hyperlipidemia, Iron Deficiency Anemia, Neuropathy, Osteoarthritis, Restless Leg Syndrome, T-Cell LGL, Still's Disease ( adult-onset ) and Polio.        ROS:  The patient complained of fatigue and arthralgia; all other systems negative.        PHYSICAL EXAM:   The patient was alert, awake and oriented, no acute distress was noted. Oral examination did not reveal any mucosal lesions. Lymph node examination did not reveal any adenopathy. Neck examination revealed a supple neck, no thyromegaly or masses were noted. Chest examination revealed normal vesicular breath sounds. Heart examination revealed S-1 and S-2 without any murmurs. Abdominal examination revealed a non-tender abdomen, bowel sounds were positive, no organomegaly could be appreciated, a laparotomy scar was present. Examination of the extremities did not reveal any tenderness or erythema. Examination of the skin did not reveal any lesions.        KPS:    80.        LABORATORY INVESTIGATIONS:  CBC showed a WBC count of 7.5, ANC was 2.3, ALC was 3.9, Hemoglobin was 15.3 and Platelets were 196. Medical problems and test results

## 2025-01-27 ENCOUNTER — TELEPHONE (OUTPATIENT)
Dept: RHEUMATOLOGY | Age: 65
End: 2025-01-27

## 2025-01-27 NOTE — TELEPHONE ENCOUNTER
----- Message from Dr. Rajat Ge MD sent at 1/26/2025  7:42 PM EST -----  Based on his LFTs being elevated please tell him to stop the methotrexate completely but continue the Anakinra daily.  He needs to avoid any Tylenol containing products as well as alcohol both of which can elevate his liver function tests.    I called and spoke with the pt, advised him of message above. Pt verbally understood.

## 2025-01-29 LAB
M TB IFN-G BLD-IMP: NEGATIVE
M TB IFN-G CD4+ T-CELLS BLD-ACNC: 0.05 IU/ML
M TBIFN-G CD4+ CD8+T-CELLS BLD-ACNC: 0.04 IU/ML
QUANTIFERON CRITERIA: NORMAL
QUANTIFERON MITOGEN VALUE: >10 IU/ML
QUANTIFERON NIL VALUE: 0.08 IU/ML

## 2025-04-08 ENCOUNTER — OFFICE VISIT (OUTPATIENT)
Dept: RHEUMATOLOGY | Age: 65
End: 2025-04-08
Payer: MEDICARE

## 2025-04-08 VITALS
OXYGEN SATURATION: 95 % | HEART RATE: 52 BPM | HEIGHT: 68 IN | BODY MASS INDEX: 34.31 KG/M2 | WEIGHT: 226.4 LBS | SYSTOLIC BLOOD PRESSURE: 140 MMHG | DIASTOLIC BLOOD PRESSURE: 90 MMHG

## 2025-04-08 DIAGNOSIS — M06.1 STILL'S DISEASE OF ADULT (HCC): Primary | ICD-10-CM

## 2025-04-08 DIAGNOSIS — Z79.899 LONG-TERM USE OF HIGH-RISK MEDICATION: ICD-10-CM

## 2025-04-08 DIAGNOSIS — M06.1 STILL'S DISEASE OF ADULT (HCC): ICD-10-CM

## 2025-04-08 LAB
ALBUMIN SERPL-MCNC: 4.1 G/DL (ref 3.2–4.6)
ALBUMIN/GLOB SERPL: 1.4 (ref 1–1.9)
ALP SERPL-CCNC: 63 U/L (ref 40–129)
ALT SERPL-CCNC: 75 U/L (ref 8–55)
ANION GAP SERPL CALC-SCNC: 10 MMOL/L (ref 7–16)
AST SERPL-CCNC: 42 U/L (ref 15–37)
BASOPHILS # BLD: 0.08 K/UL (ref 0–0.2)
BASOPHILS NFR BLD: 1 % (ref 0–2)
BILIRUB SERPL-MCNC: 0.6 MG/DL (ref 0–1.2)
BUN SERPL-MCNC: 18 MG/DL (ref 8–23)
CALCIUM SERPL-MCNC: 9.6 MG/DL (ref 8.8–10.2)
CHLORIDE SERPL-SCNC: 105 MMOL/L (ref 98–107)
CO2 SERPL-SCNC: 25 MMOL/L (ref 20–29)
CREAT SERPL-MCNC: 1.09 MG/DL (ref 0.8–1.3)
CRP SERPL-MCNC: <0.3 MG/DL (ref 0–0.4)
DIFFERENTIAL METHOD BLD: ABNORMAL
EOSINOPHIL # BLD: 0.59 K/UL (ref 0–0.8)
EOSINOPHIL NFR BLD: 7.4 % (ref 0.5–7.8)
ERYTHROCYTE [DISTWIDTH] IN BLOOD BY AUTOMATED COUNT: 12.7 % (ref 11.9–14.6)
GLOBULIN SER CALC-MCNC: 2.9 G/DL (ref 2.3–3.5)
GLUCOSE SERPL-MCNC: 102 MG/DL (ref 70–99)
HCT VFR BLD AUTO: 44.2 % (ref 41.1–50.3)
HGB BLD-MCNC: 15 G/DL (ref 13.6–17.2)
IMM GRANULOCYTES # BLD AUTO: 0.03 K/UL (ref 0–0.5)
IMM GRANULOCYTES NFR BLD AUTO: 0.4 % (ref 0–5)
LYMPHOCYTES # BLD: 4.13 K/UL (ref 0.5–4.6)
LYMPHOCYTES NFR BLD: 51.7 % (ref 13–44)
MCH RBC QN AUTO: 31.6 PG (ref 26.1–32.9)
MCHC RBC AUTO-ENTMCNC: 33.9 G/DL (ref 31.4–35)
MCV RBC AUTO: 93.2 FL (ref 82–102)
MONOCYTES # BLD: 0.5 K/UL (ref 0.1–1.3)
MONOCYTES NFR BLD: 6.3 % (ref 4–12)
NEUTS SEG # BLD: 2.66 K/UL (ref 1.7–8.2)
NEUTS SEG NFR BLD: 33.2 % (ref 43–78)
NRBC # BLD: 0 K/UL (ref 0–0.2)
PLATELET # BLD AUTO: 230 K/UL (ref 150–450)
PMV BLD AUTO: 11.1 FL (ref 9.4–12.3)
POTASSIUM SERPL-SCNC: 4.4 MMOL/L (ref 3.5–5.1)
PROT SERPL-MCNC: 7 G/DL (ref 6.3–8.2)
RBC # BLD AUTO: 4.74 M/UL (ref 4.23–5.6)
SODIUM SERPL-SCNC: 140 MMOL/L (ref 136–145)
WBC # BLD AUTO: 8 K/UL (ref 4.3–11.1)

## 2025-04-08 PROCEDURE — 3080F DIAST BP >= 90 MM HG: CPT | Performed by: INTERNAL MEDICINE

## 2025-04-08 PROCEDURE — 1036F TOBACCO NON-USER: CPT | Performed by: INTERNAL MEDICINE

## 2025-04-08 PROCEDURE — G8427 DOCREV CUR MEDS BY ELIG CLIN: HCPCS | Performed by: INTERNAL MEDICINE

## 2025-04-08 PROCEDURE — G2211 COMPLEX E/M VISIT ADD ON: HCPCS | Performed by: INTERNAL MEDICINE

## 2025-04-08 PROCEDURE — 3077F SYST BP >= 140 MM HG: CPT | Performed by: INTERNAL MEDICINE

## 2025-04-08 PROCEDURE — G8417 CALC BMI ABV UP PARAM F/U: HCPCS | Performed by: INTERNAL MEDICINE

## 2025-04-08 PROCEDURE — 3017F COLORECTAL CA SCREEN DOC REV: CPT | Performed by: INTERNAL MEDICINE

## 2025-04-08 PROCEDURE — 99214 OFFICE O/P EST MOD 30 MIN: CPT | Performed by: INTERNAL MEDICINE

## 2025-04-08 RX ORDER — ANAKINRA 100 MG/.67ML
100 INJECTION, SOLUTION SUBCUTANEOUS DAILY
Qty: 28 EACH | Refills: 3 | Status: ACTIVE | OUTPATIENT
Start: 2025-04-08

## 2025-04-08 ASSESSMENT — ROUTINE ASSESSMENT OF PATIENT INDEX DATA (RAPID3)
ON A SCALE OF ONE TO TEN, HOW MUCH PAIN HAVE YOU HAD BECAUSE OF YOUR CONDITION OVER THE PAST WEEK?: 3
ON A SCALE OF ONE TO TEN, HOW DIFFICULT WAS IT FOR YOU TO COMPLETE THE LISTED DAILY PHYSICAL TASKS OVER THE LAST WEEK: 0.5
ON A SCALE OF ONE TO TEN, CONSIDERING ALL THE WAYS IN WHICH ILLNESS AND HEALTH CONDITIONS MAY AFFECT YOU AT THIS TIME, PLEASE INDICATE BELOW HOW YOU ARE DOING:: 3
WHEN YOU AWAKENED IN THE MORNING OVER THE LAST WEEK, PLEASE INDICATE THE AMOUNT OF TIME IT TAKES UNTIL YOU ARE AS LIMBER AS YOU WILL BE FOR THE DAY: 45 MIN
ON A SCALE OF ONE TO TEN, HOW MUCH OF A PROBLEM HAS UNUSUAL FATIGUE OR TIREDNESS BEEN FOR YOU OVER THE PAST WEEK?: 3

## 2025-04-08 ASSESSMENT — JOINT PAIN
TOTAL NUMBER OF TENDER JOINTS: 6
TOTAL NUMBER OF SWOLLEN JOINTS: 0

## 2025-04-08 NOTE — PROGRESS NOTES
CBC with Auto Differential; Future    Disease activity plan:  As stated above.    Steroid management plan:  As stated above, if applicable.    Pain management plan:  As stated above, if applicable.    Weight management plan:  Weight loss through diet and exercise is always encouraged    Disease prognosis: Good    I appreciate the opportunity to continue to participate in the care of this patient.     Follow-up and Dispositions    Return in about 3 months (around 7/8/2025).       Electronically signed by:  Rajat Ge MD      This note was dictated using dragon voice recognition software.  It has been proofread, but there may still exist voice recognition errors that the author did not detect.                --------------------------------------------------------------------------------------------------------------------------------------------------------------------------------------------------------------------------------

## 2025-04-11 ASSESSMENT — SLEEP AND FATIGUE QUESTIONNAIRES
HOW LIKELY ARE YOU TO NOD OFF OR FALL ASLEEP WHILE LYING DOWN TO REST IN THE AFTERNOON WHEN CIRCUMSTANCES PERMIT: WOULD NEVER DOZE
HOW LIKELY ARE YOU TO NOD OFF OR FALL ASLEEP IN A CAR, WHILE STOPPED FOR A FEW MINUTES IN TRAFFIC: WOULD NEVER DOZE
HOW LIKELY ARE YOU TO NOD OFF OR FALL ASLEEP WHILE SITTING AND TALKING TO SOMEONE: WOULD NEVER DOZE
HOW LIKELY ARE YOU TO NOD OFF OR FALL ASLEEP WHILE SITTING INACTIVE IN A PUBLIC PLACE: WOULD NEVER DOZE
HOW LIKELY ARE YOU TO NOD OFF OR FALL ASLEEP WHILE SITTING INACTIVE IN A PUBLIC PLACE: WOULD NEVER DOZE
HOW LIKELY ARE YOU TO NOD OFF OR FALL ASLEEP IN A CAR, WHILE STOPPED FOR A FEW MINUTES IN TRAFFIC: WOULD NEVER DOZE
HOW LIKELY ARE YOU TO NOD OFF OR FALL ASLEEP WHILE SITTING QUIETLY AFTER LUNCH WITHOUT ALCOHOL: SLIGHT CHANCE OF DOZING
HOW LIKELY ARE YOU TO NOD OFF OR FALL ASLEEP WHILE WATCHING TV: MODERATE CHANCE OF DOZING
HOW LIKELY ARE YOU TO NOD OFF OR FALL ASLEEP WHILE SITTING AND READING: MODERATE CHANCE OF DOZING
HOW LIKELY ARE YOU TO NOD OFF OR FALL ASLEEP WHILE SITTING QUIETLY AFTER LUNCH WITHOUT ALCOHOL: SLIGHT CHANCE OF DOZING
HOW LIKELY ARE YOU TO NOD OFF OR FALL ASLEEP WHILE LYING DOWN TO REST IN THE AFTERNOON WHEN CIRCUMSTANCES PERMIT: WOULD NEVER DOZE
ESS TOTAL SCORE: 6
HOW LIKELY ARE YOU TO NOD OFF OR FALL ASLEEP WHILE SITTING AND READING: MODERATE CHANCE OF DOZING
HOW LIKELY ARE YOU TO NOD OFF OR FALL ASLEEP WHEN YOU ARE A PASSENGER IN A CAR FOR AN HOUR WITHOUT A BREAK: SLIGHT CHANCE OF DOZING
HOW LIKELY ARE YOU TO NOD OFF OR FALL ASLEEP WHILE SITTING AND TALKING TO SOMEONE: WOULD NEVER DOZE
HOW LIKELY ARE YOU TO NOD OFF OR FALL ASLEEP WHEN YOU ARE A PASSENGER IN A CAR FOR AN HOUR WITHOUT A BREAK: SLIGHT CHANCE OF DOZING
HOW LIKELY ARE YOU TO NOD OFF OR FALL ASLEEP WHILE WATCHING TV: MODERATE CHANCE OF DOZING

## 2025-04-14 ENCOUNTER — OFFICE VISIT (OUTPATIENT)
Dept: SLEEP MEDICINE | Age: 65
End: 2025-04-14
Payer: MEDICARE

## 2025-04-14 VITALS
HEIGHT: 68 IN | HEART RATE: 53 BPM | DIASTOLIC BLOOD PRESSURE: 80 MMHG | RESPIRATION RATE: 17 BRPM | WEIGHT: 227 LBS | SYSTOLIC BLOOD PRESSURE: 170 MMHG | OXYGEN SATURATION: 94 % | BODY MASS INDEX: 34.4 KG/M2

## 2025-04-14 DIAGNOSIS — G47.31 CENTRAL SLEEP APNEA: ICD-10-CM

## 2025-04-14 DIAGNOSIS — G47.34 NOCTURNAL HYPOXEMIA: ICD-10-CM

## 2025-04-14 DIAGNOSIS — G47.8 NON-RESTORATIVE SLEEP: ICD-10-CM

## 2025-04-14 DIAGNOSIS — G47.10 HYPERSOMNOLENCE: ICD-10-CM

## 2025-04-14 DIAGNOSIS — G47.33 OSA (OBSTRUCTIVE SLEEP APNEA): Primary | ICD-10-CM

## 2025-04-14 PROCEDURE — 1036F TOBACCO NON-USER: CPT | Performed by: NURSE PRACTITIONER

## 2025-04-14 PROCEDURE — 3079F DIAST BP 80-89 MM HG: CPT | Performed by: NURSE PRACTITIONER

## 2025-04-14 PROCEDURE — G8427 DOCREV CUR MEDS BY ELIG CLIN: HCPCS | Performed by: NURSE PRACTITIONER

## 2025-04-14 PROCEDURE — G8417 CALC BMI ABV UP PARAM F/U: HCPCS | Performed by: NURSE PRACTITIONER

## 2025-04-14 PROCEDURE — 99213 OFFICE O/P EST LOW 20 MIN: CPT | Performed by: NURSE PRACTITIONER

## 2025-04-14 PROCEDURE — 3017F COLORECTAL CA SCREEN DOC REV: CPT | Performed by: NURSE PRACTITIONER

## 2025-04-14 PROCEDURE — 3077F SYST BP >= 140 MM HG: CPT | Performed by: NURSE PRACTITIONER

## 2025-04-14 PROCEDURE — G2211 COMPLEX E/M VISIT ADD ON: HCPCS | Performed by: NURSE PRACTITIONER

## 2025-04-14 NOTE — PATIENT INSTRUCTIONS
Continue BiPAP ST 20/16 cm H2O with respiratory rate of 12 bpm with nightly compliance  New BiPAP ST supplies ordered  Recommendations as above  Follow-up in 6 months or sooner if needed    CPAP comfort cover may be able to help with mask leaks.   Available for purchase online at [email or web address]

## 2025-04-14 NOTE — PROGRESS NOTES
CNP  Electronically Dated:  4/14/2025             Collaborating Physician: Dr. Goodson    Today's office visit was spent  reviewing test results, prognosis, importance of compliance, education about disease process, benefits of medications, instructions for management of acute flare-ups, and follow up plans.  Total time spent with patient was 20 minutes.        ERIN Temple - CNP  Electronically signed

## 2025-05-19 ASSESSMENT — PATIENT HEALTH QUESTIONNAIRE - PHQ9
SUM OF ALL RESPONSES TO PHQ QUESTIONS 1-9: 1
SUM OF ALL RESPONSES TO PHQ QUESTIONS 1-9: 1
2. FEELING DOWN, DEPRESSED OR HOPELESS: NOT AT ALL
SUM OF ALL RESPONSES TO PHQ QUESTIONS 1-9: 1
SUM OF ALL RESPONSES TO PHQ9 QUESTIONS 1 & 2: 1
1. LITTLE INTEREST OR PLEASURE IN DOING THINGS: SEVERAL DAYS
SUM OF ALL RESPONSES TO PHQ QUESTIONS 1-9: 1
1. LITTLE INTEREST OR PLEASURE IN DOING THINGS: SEVERAL DAYS
2. FEELING DOWN, DEPRESSED OR HOPELESS: NOT AT ALL

## 2025-05-20 SDOH — ECONOMIC STABILITY: FOOD INSECURITY: WITHIN THE PAST 12 MONTHS, YOU WORRIED THAT YOUR FOOD WOULD RUN OUT BEFORE YOU GOT MONEY TO BUY MORE.: NEVER TRUE

## 2025-05-20 SDOH — ECONOMIC STABILITY: INCOME INSECURITY: IN THE LAST 12 MONTHS, WAS THERE A TIME WHEN YOU WERE NOT ABLE TO PAY THE MORTGAGE OR RENT ON TIME?: NO

## 2025-05-20 SDOH — ECONOMIC STABILITY: FOOD INSECURITY: WITHIN THE PAST 12 MONTHS, THE FOOD YOU BOUGHT JUST DIDN'T LAST AND YOU DIDN'T HAVE MONEY TO GET MORE.: NEVER TRUE

## 2025-05-20 SDOH — ECONOMIC STABILITY: TRANSPORTATION INSECURITY
IN THE PAST 12 MONTHS, HAS THE LACK OF TRANSPORTATION KEPT YOU FROM MEDICAL APPOINTMENTS OR FROM GETTING MEDICATIONS?: NO

## 2025-05-23 ENCOUNTER — OFFICE VISIT (OUTPATIENT)
Dept: INTERNAL MEDICINE CLINIC | Facility: CLINIC | Age: 65
End: 2025-05-23

## 2025-05-23 VITALS
OXYGEN SATURATION: 99 % | WEIGHT: 223 LBS | DIASTOLIC BLOOD PRESSURE: 85 MMHG | HEART RATE: 57 BPM | SYSTOLIC BLOOD PRESSURE: 133 MMHG | BODY MASS INDEX: 33.91 KG/M2

## 2025-05-23 DIAGNOSIS — E66.09 CLASS 1 OBESITY DUE TO EXCESS CALORIES WITH SERIOUS COMORBIDITY AND BODY MASS INDEX (BMI) OF 34.0 TO 34.9 IN ADULT: ICD-10-CM

## 2025-05-23 DIAGNOSIS — K29.60 EROSIVE GASTRITIS: ICD-10-CM

## 2025-05-23 DIAGNOSIS — K76.0 HEPATIC STEATOSIS: ICD-10-CM

## 2025-05-23 DIAGNOSIS — E78.2 MIXED HYPERLIPIDEMIA: ICD-10-CM

## 2025-05-23 DIAGNOSIS — M06.1 STILL'S DISEASE OF ADULT (HCC): ICD-10-CM

## 2025-05-23 DIAGNOSIS — Z79.899 OTHER LONG TERM (CURRENT) DRUG THERAPY: ICD-10-CM

## 2025-05-23 DIAGNOSIS — G47.9 SLEEP DISTURBANCE: ICD-10-CM

## 2025-05-23 DIAGNOSIS — F41.1 GENERALIZED ANXIETY DISORDER: ICD-10-CM

## 2025-05-23 DIAGNOSIS — K64.4 EXTERNAL HEMORRHOIDS: ICD-10-CM

## 2025-05-23 DIAGNOSIS — E66.811 CLASS 1 OBESITY DUE TO EXCESS CALORIES WITH SERIOUS COMORBIDITY AND BODY MASS INDEX (BMI) OF 34.0 TO 34.9 IN ADULT: ICD-10-CM

## 2025-05-23 DIAGNOSIS — K21.9 GASTROESOPHAGEAL REFLUX DISEASE WITHOUT ESOPHAGITIS: ICD-10-CM

## 2025-05-23 DIAGNOSIS — G47.33 OSA (OBSTRUCTIVE SLEEP APNEA): ICD-10-CM

## 2025-05-23 DIAGNOSIS — I10 PRIMARY HYPERTENSION: Primary | ICD-10-CM

## 2025-05-23 DIAGNOSIS — M05.9 RHEUMATOID ARTHRITIS WITH RHEUMATOID FACTOR, UNSPECIFIED (HCC): ICD-10-CM

## 2025-05-23 DIAGNOSIS — C91.Z0 T-CELL LARGE GRANULAR LYMPHOCYTIC LEUKEMIA (HCC): ICD-10-CM

## 2025-05-23 DIAGNOSIS — G25.81 RESTLESS LEG SYNDROME: ICD-10-CM

## 2025-05-23 DIAGNOSIS — M06.9 RHEUMATOID ARTHRITIS INVOLVING MULTIPLE SITES, UNSPECIFIED WHETHER RHEUMATOID FACTOR PRESENT (HCC): ICD-10-CM

## 2025-05-23 PROBLEM — M89.662: Status: RESOLVED | Noted: 2018-10-24 | Resolved: 2025-05-23

## 2025-05-23 PROBLEM — D75.839 THROMBOCYTOSIS: Status: RESOLVED | Noted: 2023-06-13 | Resolved: 2025-05-23

## 2025-05-23 PROBLEM — K64.9 HEMORRHOID: Status: RESOLVED | Noted: 2023-08-21 | Resolved: 2025-05-23

## 2025-05-23 PROBLEM — B91: Status: RESOLVED | Noted: 2018-10-24 | Resolved: 2025-05-23

## 2025-05-23 PROBLEM — D72.829 LEUKOCYTOSIS: Status: RESOLVED | Noted: 2023-06-09 | Resolved: 2025-05-23

## 2025-05-23 RX ORDER — TRAZODONE HYDROCHLORIDE 50 MG/1
50 TABLET ORAL NIGHTLY
Qty: 90 TABLET | Refills: 1 | Status: SHIPPED | OUTPATIENT
Start: 2025-05-23 | End: 2025-11-19

## 2025-05-23 RX ORDER — METOPROLOL TARTRATE 50 MG
50 TABLET ORAL 2 TIMES DAILY
Qty: 180 TABLET | Refills: 1 | Status: SHIPPED | OUTPATIENT
Start: 2025-05-23 | End: 2025-11-19

## 2025-05-23 RX ORDER — PANTOPRAZOLE SODIUM 40 MG/1
40 TABLET, DELAYED RELEASE ORAL
Qty: 90 TABLET | Refills: 1 | Status: SHIPPED | OUTPATIENT
Start: 2025-05-23 | End: 2025-11-19

## 2025-05-23 RX ORDER — DICLOFENAC SODIUM 75 MG/1
75 TABLET, DELAYED RELEASE ORAL 2 TIMES DAILY PRN
Qty: 180 TABLET | Refills: 1 | Status: SHIPPED | OUTPATIENT
Start: 2025-05-23

## 2025-05-23 NOTE — PROGRESS NOTES
Arturo Cerrato D.O.   [place] [address]  Tel: [phone]    Office Visit: Follow Up     Patient Name: Adilson Cloud   :  1960   MRN:   [id number]      Today's Date: 25 8:43 AM    Subjective     The patient is a 64 y.o.-year-old male who presents for follow-up.    Primary hypertension  - Metoprolol tartrate 50 mg twice daily  - He states his BP has been slightly lower than it is today in the office.      GERD  - Protonix 40 mg daily  - He reports this is doing well.      Mixed hyperlipidemia  - Lipid panel from 8/3/2023 showed total cholesterol 193, HDL 48, LDL of 117, triglycerides 140, VLDL of 28  - He reports he is eating healthy and working a lot outside.      Adult onset stills disease  - Anakinra 100 mg / 0.67 mL SOSY injection, inject 0.67 mL daily  - Folvite 1 mg daily  - Patient following with rheumatology, last seen on 2025, plan at this time was to continue anakinra and keep off methotrexate until results of labs and if LFTs have normalized to start methotrexate back.  - Patient will follow-up with rheumatology 2025     T-cell large granular lymphocytic leukemia  -Diagnosed 2023  -Patient following with hematology/oncology, last seen 2025, plan at this time was to continue pantoprazole metoprolol and follow-up for repeat labs in 6 months.  - Patient has follow-up with hematology and oncology on 2025.     Hepatic Steatosis   - Abdominal Ultrasound from 2023 showed hepatic steatosis with borderline hepatomegaly.  - Liver panel from 2025 showed ALT of 75 and AST of 42 which was somewhat improved from 2025.  - He reports he is eating healthy and working a lot outside.      Generalized anxiety disorder with sleep disturbance   -Ativan 0.5 mg daily as needed  -Trazodone 50 mg nightly  -He reports he is doing well with his anxiety and is sleeping well at night. He is using Mucinex DM right

## 2025-05-29 ENCOUNTER — RESULTS FOLLOW-UP (OUTPATIENT)
Dept: INTERNAL MEDICINE CLINIC | Facility: CLINIC | Age: 65
End: 2025-05-29

## 2025-05-29 NOTE — RESULT ENCOUNTER NOTE
Patient's liver ultrasound continues to show hepatomegaly and hepatic steatosis with the liver being stable in size from last ultrasound.

## 2025-07-08 ENCOUNTER — OFFICE VISIT (OUTPATIENT)
Dept: RHEUMATOLOGY | Age: 65
End: 2025-07-08
Payer: MEDICARE

## 2025-07-08 VITALS
OXYGEN SATURATION: 95 % | SYSTOLIC BLOOD PRESSURE: 142 MMHG | DIASTOLIC BLOOD PRESSURE: 84 MMHG | HEART RATE: 59 BPM | BODY MASS INDEX: 33.01 KG/M2 | HEIGHT: 68 IN | WEIGHT: 217.8 LBS

## 2025-07-08 DIAGNOSIS — Z79.899 LONG-TERM USE OF HIGH-RISK MEDICATION: ICD-10-CM

## 2025-07-08 DIAGNOSIS — M06.1 STILL'S DISEASE OF ADULT (HCC): ICD-10-CM

## 2025-07-08 DIAGNOSIS — M06.1 STILL'S DISEASE OF ADULT (HCC): Primary | ICD-10-CM

## 2025-07-08 DIAGNOSIS — R79.89 ELEVATED LFTS: ICD-10-CM

## 2025-07-08 LAB
ALBUMIN SERPL-MCNC: 4.2 G/DL (ref 3.2–4.6)
ALBUMIN/GLOB SERPL: 1.3 (ref 1–1.9)
ALP SERPL-CCNC: 69 U/L (ref 40–129)
ALT SERPL-CCNC: 101 U/L (ref 8–55)
ANION GAP SERPL CALC-SCNC: 11 MMOL/L (ref 7–16)
AST SERPL-CCNC: 47 U/L (ref 15–37)
BASOPHILS # BLD: 0.09 K/UL (ref 0–0.2)
BASOPHILS NFR BLD: 1.1 % (ref 0–2)
BILIRUB SERPL-MCNC: 0.8 MG/DL (ref 0–1.2)
BUN SERPL-MCNC: 24 MG/DL (ref 8–23)
CALCIUM SERPL-MCNC: 10.3 MG/DL (ref 8.8–10.2)
CHLORIDE SERPL-SCNC: 105 MMOL/L (ref 98–107)
CO2 SERPL-SCNC: 26 MMOL/L (ref 20–29)
CREAT SERPL-MCNC: 1.3 MG/DL (ref 0.8–1.3)
CRP SERPL-MCNC: <0.3 MG/DL (ref 0–0.4)
DIFFERENTIAL METHOD BLD: ABNORMAL
EOSINOPHIL # BLD: 0.68 K/UL (ref 0–0.8)
EOSINOPHIL NFR BLD: 8.2 % (ref 0.5–7.8)
ERYTHROCYTE [DISTWIDTH] IN BLOOD BY AUTOMATED COUNT: 13.1 % (ref 11.9–14.6)
GLOBULIN SER CALC-MCNC: 3.2 G/DL (ref 2.3–3.5)
GLUCOSE SERPL-MCNC: 108 MG/DL (ref 70–99)
HCT VFR BLD AUTO: 46.1 % (ref 41.1–50.3)
HGB BLD-MCNC: 15.4 G/DL (ref 13.6–17.2)
IMM GRANULOCYTES # BLD AUTO: 0.02 K/UL (ref 0–0.5)
IMM GRANULOCYTES NFR BLD AUTO: 0.2 % (ref 0–5)
LYMPHOCYTES # BLD: 4.35 K/UL (ref 0.5–4.6)
LYMPHOCYTES NFR BLD: 52.7 % (ref 13–44)
MCH RBC QN AUTO: 31.4 PG (ref 26.1–32.9)
MCHC RBC AUTO-ENTMCNC: 33.4 G/DL (ref 31.4–35)
MCV RBC AUTO: 94.1 FL (ref 82–102)
MONOCYTES # BLD: 0.65 K/UL (ref 0.1–1.3)
MONOCYTES NFR BLD: 7.9 % (ref 4–12)
NEUTS SEG # BLD: 2.46 K/UL (ref 1.7–8.2)
NEUTS SEG NFR BLD: 29.9 % (ref 43–78)
NRBC # BLD: 0 K/UL (ref 0–0.2)
PLATELET # BLD AUTO: 201 K/UL (ref 150–450)
PMV BLD AUTO: 11.2 FL (ref 9.4–12.3)
POTASSIUM SERPL-SCNC: 4.7 MMOL/L (ref 3.5–5.1)
PROT SERPL-MCNC: 7.4 G/DL (ref 6.3–8.2)
RBC # BLD AUTO: 4.9 M/UL (ref 4.23–5.6)
SODIUM SERPL-SCNC: 142 MMOL/L (ref 136–145)
WBC # BLD AUTO: 8.3 K/UL (ref 4.3–11.1)

## 2025-07-08 PROCEDURE — G8417 CALC BMI ABV UP PARAM F/U: HCPCS | Performed by: INTERNAL MEDICINE

## 2025-07-08 PROCEDURE — G2211 COMPLEX E/M VISIT ADD ON: HCPCS | Performed by: INTERNAL MEDICINE

## 2025-07-08 PROCEDURE — 3017F COLORECTAL CA SCREEN DOC REV: CPT | Performed by: INTERNAL MEDICINE

## 2025-07-08 PROCEDURE — G8427 DOCREV CUR MEDS BY ELIG CLIN: HCPCS | Performed by: INTERNAL MEDICINE

## 2025-07-08 PROCEDURE — 3079F DIAST BP 80-89 MM HG: CPT | Performed by: INTERNAL MEDICINE

## 2025-07-08 PROCEDURE — 1123F ACP DISCUSS/DSCN MKR DOCD: CPT | Performed by: INTERNAL MEDICINE

## 2025-07-08 PROCEDURE — 1036F TOBACCO NON-USER: CPT | Performed by: INTERNAL MEDICINE

## 2025-07-08 PROCEDURE — 3077F SYST BP >= 140 MM HG: CPT | Performed by: INTERNAL MEDICINE

## 2025-07-08 PROCEDURE — 99214 OFFICE O/P EST MOD 30 MIN: CPT | Performed by: INTERNAL MEDICINE

## 2025-07-08 RX ORDER — ANAKINRA 100 MG/.67ML
100 INJECTION, SOLUTION SUBCUTANEOUS DAILY
Qty: 28 EACH | Refills: 3 | Status: ACTIVE | OUTPATIENT
Start: 2025-07-08

## 2025-07-08 ASSESSMENT — JOINT PAIN
TOTAL NUMBER OF SWOLLEN JOINTS: 0
TOTAL NUMBER OF TENDER JOINTS: 10

## 2025-07-08 ASSESSMENT — ROUTINE ASSESSMENT OF PATIENT INDEX DATA (RAPID3)
ON A SCALE OF ONE TO TEN, HOW MUCH PAIN HAVE YOU HAD BECAUSE OF YOUR CONDITION OVER THE PAST WEEK?: 4
ON A SCALE OF ONE TO TEN, HOW DIFFICULT WAS IT FOR YOU TO COMPLETE THE LISTED DAILY PHYSICAL TASKS OVER THE LAST WEEK: 0.6
ON A SCALE OF ONE TO TEN, CONSIDERING ALL THE WAYS IN WHICH ILLNESS AND HEALTH CONDITIONS MAY AFFECT YOU AT THIS TIME, PLEASE INDICATE BELOW HOW YOU ARE DOING:: 5
ON A SCALE OF ONE TO TEN, HOW MUCH OF A PROBLEM HAS UNUSUAL FATIGUE OR TIREDNESS BEEN FOR YOU OVER THE PAST WEEK?: 4
WHEN YOU AWAKENED IN THE MORNING OVER THE LAST WEEK, PLEASE INDICATE THE AMOUNT OF TIME IT TAKES UNTIL YOU ARE AS LIMBER AS YOU WILL BE FOR THE DAY: 15 MIN

## 2025-07-08 NOTE — PROGRESS NOTES
[facility]  Rajat Ge M.D.  [address] , [address]   [facility]  Office : [phone], Fax: [phone]     RHEUMATOLOGY OFFICE VISIT NOTE  Date of Visit:  2025 8:33 AM    Patient Information:  Name:  Adilson Cloud  :  1960  Age:  65 y.o.   Gender:  male      Mr. Cloud is here today for follow-up of Adult Onset Stills disease and medication monitoring.     Last visit: 25    History of Present Illness: On talking to the patient today he states that he has had hemorrhoids with rectal pain with occasional bleeding with no melena though. He is going to see the surgeon on 25 to have surgery possibly. In light of his LFT's being elevated he has been off the methotrexate since January of this year.  With regard to his blood pressure being elevated he states that his B.P this morning at home was 112/76.  Patient's current joint complaints are as mentioned below.    Since the last visit, patient is feeling \"fair\".    Pain: 4/10  Location:  Bilateral knee pain worse in the right knee than the left knee with no swelling, warmth and redness. No buckling of the knees. Occasional ankle pain with swelling but no buckling with no warmth and redness. Some pain in the ball and heel of both his feet. Occasional bilateral hip pain with no groin pain. Bilateral wrist pain with no swelling, warmth and redness.   Quality: Sharp pain.   Modifying Factors: Standing and walking worsens the knee pain.   Associated Symptoms:  No tingling, numbness or pain down the arms or legs with intermittent tingling and numbness of his feet. No UE or LE weakness. No limitations with his ADL's.         2025     8:00 AM   DMARD/Biologic   AM Stiffness 15 min   Pain 4   Fatigue 4   MDHAQ 0.6   Patient Global Score 5   Medication Name Other   Other Medication Kineret     Last TB screen: 25   TB result: Negative      Current dose of steroids:none  How long on

## 2025-07-09 ENCOUNTER — RESULTS FOLLOW-UP (OUTPATIENT)
Dept: RHEUMATOLOGY | Age: 65
End: 2025-07-09

## 2025-07-10 NOTE — TELEPHONE ENCOUNTER
----- Message from Dr. Rajat Ge MD sent at 7/9/2025  1:44 PM EDT -----  Cannot start methotrexate back.  Needs to follow-up with his PCP with regard to his elevated LFT's since it is not secondary to Kineret prescribed by me.  Continue Kineret as prescribed by me.     I called and spoke with the pt, advised him of message above. Pt verbally understood.

## 2025-07-16 ENCOUNTER — OFFICE VISIT (OUTPATIENT)
Dept: SURGERY | Age: 65
End: 2025-07-16
Payer: MEDICARE

## 2025-07-16 VITALS
HEIGHT: 68 IN | SYSTOLIC BLOOD PRESSURE: 130 MMHG | BODY MASS INDEX: 33.65 KG/M2 | DIASTOLIC BLOOD PRESSURE: 88 MMHG | HEART RATE: 60 BPM | WEIGHT: 222 LBS | OXYGEN SATURATION: 98 %

## 2025-07-16 DIAGNOSIS — K64.8 INTERNAL AND EXTERNAL BLEEDING HEMORRHOIDS: Primary | ICD-10-CM

## 2025-07-16 DIAGNOSIS — K64.4 INTERNAL AND EXTERNAL BLEEDING HEMORRHOIDS: Primary | ICD-10-CM

## 2025-07-16 PROCEDURE — 99204 OFFICE O/P NEW MOD 45 MIN: CPT | Performed by: SURGERY

## 2025-07-16 PROCEDURE — 3079F DIAST BP 80-89 MM HG: CPT | Performed by: SURGERY

## 2025-07-16 PROCEDURE — 3017F COLORECTAL CA SCREEN DOC REV: CPT | Performed by: SURGERY

## 2025-07-16 PROCEDURE — 1123F ACP DISCUSS/DSCN MKR DOCD: CPT | Performed by: SURGERY

## 2025-07-16 PROCEDURE — G8427 DOCREV CUR MEDS BY ELIG CLIN: HCPCS | Performed by: SURGERY

## 2025-07-16 PROCEDURE — 1036F TOBACCO NON-USER: CPT | Performed by: SURGERY

## 2025-07-16 PROCEDURE — G8417 CALC BMI ABV UP PARAM F/U: HCPCS | Performed by: SURGERY

## 2025-07-16 PROCEDURE — 3075F SYST BP GE 130 - 139MM HG: CPT | Performed by: SURGERY

## 2025-07-16 RX ORDER — HYDROCORTISONE ACETATE 25 MG/1
25 SUPPOSITORY RECTAL EVERY 12 HOURS
Qty: 48 SUPPOSITORY | Refills: 0 | Status: SHIPPED | OUTPATIENT
Start: 2025-07-16

## 2025-07-16 NOTE — PROGRESS NOTES
GENERAL SURGERY NEW PATIENT NOTE    PRIMARY CARE PROVIDER:Arturo Cerrato DO     REFERRING PHYSICIAN: Arturo Cerrato     Dear Dr. Cerrato,  I had the pleasure of seeing your patient, Adilson Cloud, in the General Surgery clinic at [facility]. My findings and recommendations are as follows:     Date of visit: 07/16/25    CHIEF COMPLAINT: Hemorrhoids    HISTORY OF PRESENT ILLNESS:  Adilson Cloud is a 65 y.o. male with history of obesity (BMI 34), HTN, HLD, HAILEY on CPAP, GERD, adult onset stills disease, T-cell large granular lymphocytic leukemia, hepatic steatosis, generalized anxiety disorder, BPH, RLS, sigmoid colon cancer status post exploratory laparotomy and sigmoid colectomy, prior hemorrhoids status post excisional hemorrhoidectomy 2005 referred for evaluation of recurrent hemorrhoids.     Patient states after his excisional hemorrhoidectomy in 2005 he had no further issues with hemorrhoids for approximately 10 years.  However, he states that in approximately 2015 he started to have some further bleeding and pain around his anus and noticed that the hemorrhoids did come back.  He has tried multiple interventions over the past 10 years including Preparation H, suppositories, lidocaine cream, fiber supplementation, and sitz bath's.  He states that he will continue to get intermittent bleeding and intermittent pain with these hemorrhoids, especially with bowel movements.  Reports that occasionally the hemorrhoids will improve for a while, but then come back.  Denies any wetness or irritation.  Denies any prolapse or problems with leakage or control.  He reports normal habits of 1-2 bowel movements daily.  Denies any history of constipation or diarrhea.  He states he usually only drinks Pepsi and Gatorade and is drinking approximately 50 ounces per day.  States that he usually does not drink much water during the day.  He denies any family history of colon cancer, polyps, or IBD.

## 2025-07-22 DIAGNOSIS — D72.829 LEUKOCYTOSIS, UNSPECIFIED TYPE: ICD-10-CM

## 2025-07-22 DIAGNOSIS — D75.839 THROMBOCYTOSIS: ICD-10-CM

## 2025-07-22 DIAGNOSIS — D47.1 MPN (MYELOPROLIFERATIVE NEOPLASM) (HCC): ICD-10-CM

## 2025-07-22 DIAGNOSIS — R79.89 ELEVATED FERRITIN: ICD-10-CM

## 2025-07-22 DIAGNOSIS — C91.Z0 T-CELL LARGE GRANULAR LYMPHOCYTIC LEUKEMIA (HCC): Primary | ICD-10-CM

## 2025-07-25 ENCOUNTER — OFFICE VISIT (OUTPATIENT)
Dept: ONCOLOGY | Age: 65
End: 2025-07-25
Payer: MEDICARE

## 2025-07-25 ENCOUNTER — HOSPITAL ENCOUNTER (OUTPATIENT)
Dept: LAB | Age: 65
Discharge: HOME | End: 2025-07-25
Payer: MEDICARE

## 2025-07-25 VITALS
WEIGHT: 223.8 LBS | TEMPERATURE: 97.9 F | BODY MASS INDEX: 33.92 KG/M2 | HEART RATE: 46 BPM | SYSTOLIC BLOOD PRESSURE: 167 MMHG | HEIGHT: 68 IN | OXYGEN SATURATION: 96 % | RESPIRATION RATE: 18 BRPM | DIASTOLIC BLOOD PRESSURE: 97 MMHG

## 2025-07-25 DIAGNOSIS — D47.1 MPN (MYELOPROLIFERATIVE NEOPLASM) (HCC): ICD-10-CM

## 2025-07-25 DIAGNOSIS — K29.70 GASTRITIS, PRESENCE OF BLEEDING UNSPECIFIED, UNSPECIFIED CHRONICITY, UNSPECIFIED GASTRITIS TYPE: ICD-10-CM

## 2025-07-25 DIAGNOSIS — D75.839 THROMBOCYTOSIS: ICD-10-CM

## 2025-07-25 DIAGNOSIS — D72.829 LEUKOCYTOSIS, UNSPECIFIED TYPE: ICD-10-CM

## 2025-07-25 DIAGNOSIS — C91.Z0 T-CELL LARGE GRANULAR LYMPHOCYTIC LEUKEMIA (HCC): ICD-10-CM

## 2025-07-25 DIAGNOSIS — C91.Z0 T-CELL LARGE GRANULAR LYMPHOCYTIC LEUKEMIA (HCC): Primary | ICD-10-CM

## 2025-07-25 DIAGNOSIS — I10 HYPERTENSION, UNSPECIFIED TYPE: ICD-10-CM

## 2025-07-25 DIAGNOSIS — R79.89 ELEVATED FERRITIN: ICD-10-CM

## 2025-07-25 LAB
ALBUMIN SERPL-MCNC: 4 G/DL (ref 3.2–4.6)
ALBUMIN/GLOB SERPL: 1.3 (ref 1–1.9)
ALP SERPL-CCNC: 71 U/L (ref 40–129)
ALT SERPL-CCNC: 77 U/L (ref 8–55)
ANION GAP SERPL CALC-SCNC: 8 MMOL/L (ref 7–16)
AST SERPL-CCNC: 38 U/L (ref 15–37)
BASOPHILS # BLD: 0.07 K/UL (ref 0–0.2)
BASOPHILS NFR BLD: 0.8 % (ref 0–2)
BILIRUB SERPL-MCNC: 0.5 MG/DL (ref 0–1.2)
BUN SERPL-MCNC: 24 MG/DL (ref 8–23)
CALCIUM SERPL-MCNC: 9.4 MG/DL (ref 8.8–10.2)
CHLORIDE SERPL-SCNC: 104 MMOL/L (ref 98–107)
CO2 SERPL-SCNC: 26 MMOL/L (ref 20–29)
CREAT SERPL-MCNC: 1.14 MG/DL (ref 0.8–1.3)
DIFFERENTIAL METHOD BLD: ABNORMAL
EOSINOPHIL # BLD: 0.65 K/UL (ref 0–0.8)
EOSINOPHIL NFR BLD: 7.5 % (ref 0.5–7.8)
ERYTHROCYTE [DISTWIDTH] IN BLOOD BY AUTOMATED COUNT: 13.2 % (ref 11.9–14.6)
GLOBULIN SER CALC-MCNC: 3 G/DL (ref 2.3–3.5)
GLUCOSE SERPL-MCNC: 103 MG/DL (ref 70–99)
HCT VFR BLD AUTO: 44.8 % (ref 41.1–50.3)
HGB BLD-MCNC: 15.3 G/DL (ref 13.6–17.2)
IMM GRANULOCYTES # BLD AUTO: 0.03 K/UL (ref 0–0.5)
IMM GRANULOCYTES NFR BLD AUTO: 0.3 % (ref 0–5)
LYMPHOCYTES # BLD: 4.36 K/UL (ref 0.5–4.6)
LYMPHOCYTES NFR BLD: 50.2 % (ref 13–44)
MCH RBC QN AUTO: 31.2 PG (ref 26.1–32.9)
MCHC RBC AUTO-ENTMCNC: 34.2 G/DL (ref 31.4–35)
MCV RBC AUTO: 91.2 FL (ref 82–102)
MONOCYTES # BLD: 0.63 K/UL (ref 0.1–1.3)
MONOCYTES NFR BLD: 7.2 % (ref 4–12)
NEUTS SEG # BLD: 2.95 K/UL (ref 1.7–8.2)
NEUTS SEG NFR BLD: 34 % (ref 43–78)
NRBC # BLD: 0 K/UL (ref 0–0.2)
PLATELET # BLD AUTO: 192 K/UL (ref 150–450)
PMV BLD AUTO: 10.4 FL (ref 9.4–12.3)
POTASSIUM SERPL-SCNC: 4.5 MMOL/L (ref 3.5–5.1)
PROT SERPL-MCNC: 7 G/DL (ref 6.3–8.2)
RBC # BLD AUTO: 4.91 M/UL (ref 4.23–5.6)
SODIUM SERPL-SCNC: 138 MMOL/L (ref 136–145)
WBC # BLD AUTO: 8.7 K/UL (ref 4.3–11.1)

## 2025-07-25 PROCEDURE — 85025 COMPLETE CBC W/AUTO DIFF WBC: CPT

## 2025-07-25 PROCEDURE — 80053 COMPREHEN METABOLIC PANEL: CPT

## 2025-07-25 PROCEDURE — 99214 OFFICE O/P EST MOD 30 MIN: CPT | Performed by: INTERNAL MEDICINE

## 2025-07-25 PROCEDURE — 1123F ACP DISCUSS/DSCN MKR DOCD: CPT | Performed by: INTERNAL MEDICINE

## 2025-07-25 PROCEDURE — 3017F COLORECTAL CA SCREEN DOC REV: CPT | Performed by: INTERNAL MEDICINE

## 2025-07-25 PROCEDURE — 3077F SYST BP >= 140 MM HG: CPT | Performed by: INTERNAL MEDICINE

## 2025-07-25 PROCEDURE — 3080F DIAST BP >= 90 MM HG: CPT | Performed by: INTERNAL MEDICINE

## 2025-07-25 PROCEDURE — 36415 COLL VENOUS BLD VENIPUNCTURE: CPT

## 2025-07-25 PROCEDURE — G8417 CALC BMI ABV UP PARAM F/U: HCPCS | Performed by: INTERNAL MEDICINE

## 2025-07-25 PROCEDURE — 1036F TOBACCO NON-USER: CPT | Performed by: INTERNAL MEDICINE

## 2025-07-25 PROCEDURE — G8427 DOCREV CUR MEDS BY ELIG CLIN: HCPCS | Performed by: INTERNAL MEDICINE

## 2025-07-25 NOTE — PATIENT INSTRUCTIONS
Patient Information from Today's Visit    The members of your Oncology Medical Home are listed below:    Physician Provider: Dr. Serg Monteiro   Advanced Practice Clinician: Elysia Odell   Registered Nurse: Miryam RINCON  Nurse Navigator: N/A  Medical Assistant: Mirna BEDOYA  : Elysia \"Tashia\" I.   Supportive Care Services: TOMY Kingsley    Diagnosis (Information Sheet Provided on Day of Diagnosis):     --T-cell Lymphoma    Follow Up Instructions:     Reviewed labs.   Continue taking your medications as prescribed.   Follow up as scheduled.     Has Treatment Plan Been Finalized? N/A     Current Labs:   Hospital Outpatient Visit on 07/25/2025   Component Date Value Ref Range Status    WBC 07/25/2025 8.7  4.3 - 11.1 K/uL Final    RBC 07/25/2025 4.91  4.23 - 5.6 M/uL Final    Hemoglobin 07/25/2025 15.3  13.6 - 17.2 g/dL Final    Hematocrit 07/25/2025 44.8  41.1 - 50.3 % Final    MCV 07/25/2025 91.2  82.0 - 102.0 FL Final    MCH 07/25/2025 31.2  26.1 - 32.9 PG Final    MCHC 07/25/2025 34.2  31.4 - 35.0 g/dL Final    RDW 07/25/2025 13.2  11.9 - 14.6 % Final    Platelets 07/25/2025 192  150 - 450 K/uL Final    MPV 07/25/2025 10.4  9.4 - 12.3 FL Final    nRBC 07/25/2025 0.00  0.0 - 0.2 K/uL Final    **Note: Absolute NRBC parameter is now reported with Hemogram**    Neutrophils % 07/25/2025 34.0 (L)  43.0 - 78.0 % Final    Lymphocytes % 07/25/2025 50.2 (H)  13.0 - 44.0 % Final    Monocytes % 07/25/2025 7.2  4.0 - 12.0 % Final    Eosinophils % 07/25/2025 7.5  0.5 - 7.8 % Final    Basophils % 07/25/2025 0.8  0.0 - 2.0 % Final    Immature Granulocytes % 07/25/2025 0.3  0.0 - 5.0 % Final    Neutrophils Absolute 07/25/2025 2.95  1.70 - 8.20 K/UL Final    Lymphocytes Absolute 07/25/2025 4.36  0.50 - 4.60 K/UL Final    Monocytes Absolute 07/25/2025 0.63  0.10 - 1.30 K/UL Final    Eosinophils Absolute 07/25/2025 0.65  0.00 - 0.80 K/UL Final    Basophils Absolute 07/25/2025 0.07  0.00 - 0.20 K/UL Final    Immature Granulocytes

## 2025-07-25 NOTE — PROGRESS NOTES
[address] [address]  Phone: [phone]           7/25/2025  Adilson Cloud  1960  [id number]        Adilson Cloud is a 65 year old  man who has returned to my clinic for a follow-up visit; he was initially referred to me by Dr. Promise Johnston for evaluation of Leukocytosis and Thrombocytosis, his inflammatory markers were elevated, he does not have Iron overload or Hereditary Hemochromatosis. 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      REASON FOR CLINIC VISIT:      6 month follow-up and fatigue.        ALLERGIES:    No known drug allergies.        FAMILY HISTORY:     No hematologic disorders.        SOCIAL HISTORY:     He is  and lives with his wife. He used to work as a . He denies ever using any tobacco products.        PAST MEDICAL HISTORY:     Anxiety Disorder, Colon Cancer, Hypertension, Gastritis, Hyperlipidemia, Iron Deficiency Anemia, Neuropathy, Osteoarthritis, Restless Leg Syndrome, T-Cell LGL, Still's Disease ( adult-onset ) and Polio.        ROS:  The patient complained of fatigue and arthralgia; all other systems negative.        PHYSICAL EXAM:   The patient was alert, awake and oriented, no acute distress was noted. Oral examination did not reveal any mucosal lesions. Lymph node examination did not reveal any adenopathy. Neck examination revealed a supple neck, no thyromegaly or masses were noted. Chest examination revealed normal vesicular breath sounds. Heart examination revealed S-1 and S-2 without any murmurs. Abdominal examination revealed a non-tender abdomen, bowel sounds were positive, no organomegaly could be appreciated, a laparotomy scar was present. Examination of the extremities did not reveal any tenderness or erythema. Examination of the skin did not reveal any lesions.        KPS:    80.        LABORATORY INVESTIGATIONS:  CBC showed a WBC count of 8.7, ANC was 2.9, ALC was 4.3, Hemoglobin

## (undated) DEVICE — CONTAINER FORMALIN PREFILLED 10% NBF 60ML

## (undated) DEVICE — FORCEPS BX L240CM JAW DIA2.8MM L CAP W/ NDL MIC MESH TOOTH

## (undated) DEVICE — GAUZE,SPONGE,4"X4",12PLY,WOVEN,NS,LF: Brand: MEDLINE

## (undated) DEVICE — ENDOSCOPIC KIT 1.1+ OP4 CA DE 2 GWN AAMI LEVEL 3

## (undated) DEVICE — BLOCK BITE AD 60FR W/ VELC STRP ADDRESSES MOST PT AND

## (undated) DEVICE — SYRINGE MED 10ML LUERLOCK TIP W/O SFTY DISP

## (undated) DEVICE — SINGLE PORT MANIFOLD: Brand: NEPTUNE 2

## (undated) DEVICE — SYRINGE, LUER SLIP, STERILE, 60ML: Brand: MEDLINE

## (undated) DEVICE — CANNULA NSL ORAL AD FOR CAPNOFLEX CO2 O2 AIRLFE

## (undated) DEVICE — AIRLIFE™ OXYGEN TUBING 7 FEET (2.1 M) CRUSH RESISTANT OXYGEN TUBING, VINYL TIPPED: Brand: AIRLIFE™

## (undated) DEVICE — NEEDLE SYR 18GA L1.5IN RED PLAS HUB S STL BLNT FILL W/O

## (undated) DEVICE — SYRINGE MED 3ML CLR PLAS STD N CTRL LUERLOCK TIP DISP

## (undated) DEVICE — LUBE JELLY FOIL PACK 1.4 OZ: Brand: MEDLINE INDUSTRIES, INC.

## (undated) DEVICE — CONNECTOR TBNG OD5-7MM O2 END DISP

## (undated) DEVICE — KENDALL RADIOLUCENT FOAM MONITORING ELECTRODE RECTANGULAR SHAPE: Brand: KENDALL

## (undated) DEVICE — YANKAUER,BULB TIP,W/O VENT,RIGID,STERILE: Brand: MEDLINE